# Patient Record
Sex: MALE | Race: WHITE | NOT HISPANIC OR LATINO | Employment: OTHER | URBAN - METROPOLITAN AREA
[De-identification: names, ages, dates, MRNs, and addresses within clinical notes are randomized per-mention and may not be internally consistent; named-entity substitution may affect disease eponyms.]

---

## 2017-08-21 ENCOUNTER — OFFICE VISIT (OUTPATIENT)
Dept: SURGERY | Facility: CLINIC | Age: 32
End: 2017-08-21

## 2017-08-21 VITALS
OXYGEN SATURATION: 97 % | DIASTOLIC BLOOD PRESSURE: 78 MMHG | HEIGHT: 71 IN | BODY MASS INDEX: 30.38 KG/M2 | TEMPERATURE: 98.4 F | SYSTOLIC BLOOD PRESSURE: 128 MMHG | HEART RATE: 63 BPM | RESPIRATION RATE: 12 BRPM | WEIGHT: 217 LBS

## 2017-08-21 DIAGNOSIS — R10.32 GROIN PAIN, LEFT: ICD-10-CM

## 2017-08-21 DIAGNOSIS — R10.32 LEFT LOWER QUADRANT PAIN: Primary | ICD-10-CM

## 2017-08-21 PROCEDURE — 99203 OFFICE O/P NEW LOW 30 MIN: CPT | Performed by: SURGERY

## 2017-08-21 NOTE — PROGRESS NOTES
"    PATIENT INFORMATION  Garry Alcantara   - 1985    CHIEF COMPLAINT  Chief Complaint   Patient presents with   • Hernia   Possible LIH x 1 month,   Abdominal pain-left lower quadrant ×1 month  No imaging    HISTORY OF PRESENT ILLNESS  HPI  Patient is a 32-year-old  male referred to general surgery for possible left inguinal hernia.  He reports that about a month ago he was pushing 150 pounds of hay when he felt pain in the left lower quadrant and left groin and possibly felt \"a pop\"as he was reaching around .  He denies feeling a bulge.  He denies being able to reduce anything.  He reports that now he feels a burning sensation in the left lower quadrant denies feeling a bulge.  Denies nausea, vomiting, change in bowel movements, melena, hematochezia.  He was seen at the urgent care on 17 with this complaint -- I have reviewed their office records.  He was referred to general surgery for further evaluation and treatment.      Per review of records patient had a CT scan done.  Urgent care in 2016 for kidney stones there is a mention in the report of fat in the bilateral inguinal canals.  I do not have the images.    REVIEW OF SYSTEMS  Review of Systems   Constitutional: Positive for activity change. Negative for appetite change, fatigue, fever and unexpected weight change.   Respiratory: Negative.    Cardiovascular: Negative.    Gastrointestinal: Positive for abdominal pain.   Genitourinary: Negative.    Musculoskeletal: Negative.    Neurological: Negative.    Hematological: Negative.          ACTIVE PROBLEMS  Patient Active Problem List    Diagnosis   • Left lower quadrant pain [R10.32]   • Left inguinal pain [R10.30]         PAST MEDICAL HISTORY  History reviewed. No pertinent past medical history.      SURGICAL HISTORY  Past Surgical History:   Procedure Laterality Date   • WISDOM TOOTH EXTRACTION           FAMILY HISTORY  Family History   Problem Relation Age of Onset   • " "Hypertension Father    • Colon cancer Paternal Grandfather          SOCIAL HISTORY  Social History     Occupational History   • Not on file.     Social History Main Topics   • Smoking status: Never Smoker   • Smokeless tobacco: Not on file   • Alcohol use Yes      Comment: socially   • Drug use: Not on file   • Sexual activity: Not on file         CURRENT MEDICATIONS  No current outpatient prescriptions on file.    ALLERGIES  Review of patient's allergies indicates no known allergies.    VITALS  Vitals:    08/21/17 1000   BP: 128/78   Pulse: 63   Resp: 12   Temp: 98.4 °F (36.9 °C)   SpO2: 97%   Weight: 217 lb (98.4 kg)   Height: 71\" (180.3 cm)       LAST RESULTS   No results found for any previous visit.  No results found.    PHYSICAL EXAM  Physical Exam   Constitutional: He is oriented to person, place, and time. He appears well-developed and well-nourished.   HENT:   Head: Normocephalic and atraumatic.   Eyes: EOM are normal. Pupils are equal, round, and reactive to light. No scleral icterus.   Neck: Normal range of motion. Neck supple.   Pulmonary/Chest: Breath sounds normal.   Abdominal:   Soft, nondistended, nontender, positive bowel sounds in all 4 quadrants.  Groin exam- positive bilateral impulse with Valsalva    (Bilateral groin exam done in presence of chaperone)   Musculoskeletal: He exhibits no edema.   Neurological: He is alert and oriented to person, place, and time.   Skin: Skin is warm and dry.   Nursing note and vitals reviewed.      ASSESSMENT    Abdominal pain and left lower quadrant  Left groin pain versus groin strain    Discussed with patient and his wife will check a CT abdomen and pelvis with oral and IV contrast, BMP prior to that - 2 to rule out other etiologies of his abdominal pain such as diverticulitis, groin strain, groin hematoma and possible occult hernia.    I have also asked the patient to get me the CD of the CAT scan.        Diagnoses and all orders for this visit:    Left lower " quadrant pain  -     CT Abdomen Pelvis With Contrast  -     Basic Metabolic Panel; Future    Groin pain, left  -     CT Abdomen Pelvis With Contrast  -     Basic Metabolic Panel; Future          PLAN  Follow-up after testing is completed.  Worsening signs and symptoms discussed with the patient in detail and he was advised to call the office sooner should he have worsening symptoms or go to the nearest emergency room.

## 2017-08-28 ENCOUNTER — HOSPITAL ENCOUNTER (OUTPATIENT)
Dept: CT IMAGING | Facility: HOSPITAL | Age: 32
Discharge: HOME | End: 2017-08-28
Attending: SURGERY | Admitting: SURGERY

## 2017-08-28 ENCOUNTER — LAB (OUTPATIENT)
Dept: LAB | Facility: HOSPITAL | Age: 32
End: 2017-08-28
Attending: SURGERY

## 2017-08-28 DIAGNOSIS — R10.32 LEFT LOWER QUADRANT PAIN: ICD-10-CM

## 2017-08-28 DIAGNOSIS — R10.32 GROIN PAIN, LEFT: ICD-10-CM

## 2017-08-28 LAB
ANION GAP SERPL CALCULATED.3IONS-SCNC: 11 MMOL/L
BUN BLD-MCNC: 14 MG/DL (ref 6–20)
BUN/CREAT SERPL: 12.5 (ref 7–25)
CALCIUM SPEC-SCNC: 8.6 MG/DL (ref 8.6–10.5)
CHLORIDE SERPL-SCNC: 104 MMOL/L (ref 98–107)
CO2 SERPL-SCNC: 25 MMOL/L (ref 22–29)
CREAT BLD-MCNC: 1.12 MG/DL (ref 0.76–1.27)
GFR SERPL CREATININE-BSD FRML MDRD: 76 ML/MIN/1.73
GLUCOSE BLD-MCNC: 97 MG/DL (ref 65–99)
POTASSIUM BLD-SCNC: 4.3 MMOL/L (ref 3.5–5.2)
SODIUM BLD-SCNC: 140 MMOL/L (ref 136–145)

## 2017-08-28 PROCEDURE — 74177 CT ABD & PELVIS W/CONTRAST: CPT

## 2017-08-28 PROCEDURE — 0 IOPAMIDOL PER 1 ML: Performed by: SURGERY

## 2017-08-28 PROCEDURE — 80048 BASIC METABOLIC PNL TOTAL CA: CPT

## 2017-08-28 PROCEDURE — 36415 COLL VENOUS BLD VENIPUNCTURE: CPT

## 2017-08-28 RX ADMIN — IOPAMIDOL 100 ML: 755 INJECTION, SOLUTION INTRAVENOUS at 10:15

## 2017-09-21 ENCOUNTER — OFFICE VISIT (OUTPATIENT)
Dept: GASTROENTEROLOGY | Facility: CLINIC | Age: 32
End: 2017-09-21

## 2017-09-21 VITALS
HEIGHT: 71 IN | SYSTOLIC BLOOD PRESSURE: 130 MMHG | BODY MASS INDEX: 30.38 KG/M2 | WEIGHT: 217 LBS | DIASTOLIC BLOOD PRESSURE: 78 MMHG

## 2017-09-21 DIAGNOSIS — K76.0 HEPATIC STEATOSIS: Primary | ICD-10-CM

## 2017-09-21 PROCEDURE — 99203 OFFICE O/P NEW LOW 30 MIN: CPT | Performed by: INTERNAL MEDICINE

## 2017-09-21 NOTE — PROGRESS NOTES
PATIENT INFORMATION  Garry Alcantara       - 1985    CHIEF COMPLAINT  Chief Complaint   Patient presents with   • Abdominal Pain       HISTORY OF PRESENT ILLNESS  Abdominal Pain   Associated symptoms include headaches.     31 yo sent to see me because of hepatic steatosis noted on CT. He saw Dr. Mackey for LLQ pain and possible hernia. CT was negative for hernia. He was subsequently sent to me. He drinks a beer every 3 month. Weight has been stable. Labs which are available to me from 2016 showed normal liver enzymes. No family history of liver disease.   No changes in bowel habits or blood in the stool. No ivda, blood transfusions, tattoos. His current occupation is farming. No episodes of jaundice or ascites.      REVIEW OF SYSTEMS  Review of Systems   Gastrointestinal: Positive for abdominal pain.   Genitourinary: Positive for genital sores.   Musculoskeletal: Positive for back pain.   Neurological: Positive for headaches.   All other systems reviewed and are negative.        ACTIVE PROBLEMS  Patient Active Problem List    Diagnosis   • Left lower quadrant pain [R10.32]   • Left inguinal pain [R10.30]         PAST MEDICAL HISTORY  No past medical history on file.      SURGICAL HISTORY  Past Surgical History:   Procedure Laterality Date   • WISDOM TOOTH EXTRACTION           FAMILY HISTORY  Family History   Problem Relation Age of Onset   • Hypertension Father    • Colon cancer Paternal Grandfather    • Cirrhosis Neg Hx          SOCIAL HISTORY  Social History     Occupational History   • Not on file.     Social History Main Topics   • Smoking status: Never Smoker   • Smokeless tobacco: Not on file   • Alcohol use Yes      Comment: socially   • Drug use: Not on file   • Sexual activity: Not on file         CURRENT MEDICATIONS  No current outpatient prescriptions on file.    ALLERGIES  Review of patient's allergies indicates no known allergies.    VITALS  Vitals:    17 1038   BP:  "130/78   Weight: 217 lb (98.4 kg)   Height: 71\" (180.3 cm)       LAST RESULTS   Lab on 08/28/2017   Component Date Value Ref Range Status   • Glucose 08/28/2017 97  65 - 99 mg/dL Final   • BUN 08/28/2017 14  6 - 20 mg/dL Final   • Creatinine 08/28/2017 1.12  0.76 - 1.27 mg/dL Final   • Sodium 08/28/2017 140  136 - 145 mmol/L Final   • Potassium 08/28/2017 4.3  3.5 - 5.2 mmol/L Final   • Chloride 08/28/2017 104  98 - 107 mmol/L Final   • CO2 08/28/2017 25.0  22.0 - 29.0 mmol/L Final   • Calcium 08/28/2017 8.6  8.6 - 10.5 mg/dL Final   • eGFR Non African Amer 08/28/2017 76  >60 mL/min/1.73 Final   • BUN/Creatinine Ratio 08/28/2017 12.5  7.0 - 25.0 Final   • Anion Gap 08/28/2017 11.0  mmol/L Final     Ct Abdomen Pelvis With Contrast    Result Date: 8/28/2017  Narrative: EXAM: CT ABDOMEN PELVIS WITH CONTRAST.  DATE: 08/28/2017  HISTORY: BILATERAL INGUINAL PAIN INTERMITTENTLY FOR ONE MONTH. EVALUATE FOR HERNIA.  COMPARISON: NONE   TECHNIQUE: 5 mm axial images from lung bases to the lesser trochanters after intravenous and enteric contrast administration. Radiation dose reduction techniques were utilized, including automated exposure control and exposure modulation based on body size.  ABDOMEN FINDINGS: There is mild geographic steatosis within the liver. The gallbladder, spleen, pancreas, adrenals and kidneys are within normal limits. Imaged lung bases are free of consolidation. No free air or free fluid is seen. The appendix is normal. Bowel appears nonthickened and noninflamed.  No ventral abdominal wall hernia is seen.  Pelvis findings: No inguinal hernia is identified. Shotty bilateral inguinal lymph nodes are present and are not thought to be pathologically enlarged by CT criteria. The urinary bladder, prostate and rectum are normal. No pelvic free fluid is identified.  No acute osseous abnormalities are identified.      Impression: 1. No inguinal hernia. No acute findings in the abdomen or pelvis. 2. Geographic " hepatic steatosis.  This report was finalized on 8/28/2017 12:55 PM by Dr. Joslyn Porter MD.        PHYSICAL EXAM  Physical Exam   Constitutional: He is oriented to person, place, and time. He appears well-developed and well-nourished. No distress.   HENT:   Head: Normocephalic and atraumatic.   Eyes: EOM are normal. Pupils are equal, round, and reactive to light.   Neck: Neck supple. No tracheal deviation present.   Cardiovascular: Normal rate, regular rhythm, normal heart sounds and intact distal pulses.  Exam reveals no gallop and no friction rub.    No murmur heard.  Pulmonary/Chest: Effort normal and breath sounds normal. No respiratory distress. He has no wheezes. He has no rales. He exhibits no tenderness.   Abdominal: Soft. Bowel sounds are normal. He exhibits no distension. There is no tenderness. There is no rebound and no guarding.   Musculoskeletal: He exhibits no edema.   Lymphadenopathy:     He has no cervical adenopathy.   Neurological: He is alert and oriented to person, place, and time.   Skin: Skin is warm. He is not diaphoretic. No erythema.   Psychiatric: He has a normal mood and affect. His behavior is normal. Judgment and thought content normal.   Nursing note and vitals reviewed.      ASSESSMENT  Diagnoses and all orders for this visit:    Hepatic steatosis  -     Lipid Panel  -     Hepatic Function Panel          PLAN  No Follow-up on file.    NAFLD discussed  Weight loss  Low carb diet.  Will check above labs.

## 2018-04-20 ENCOUNTER — OFFICE VISIT (OUTPATIENT)
Dept: SURGERY | Facility: CLINIC | Age: 33
End: 2018-04-20

## 2018-04-20 VITALS
SYSTOLIC BLOOD PRESSURE: 126 MMHG | BODY MASS INDEX: 30.24 KG/M2 | DIASTOLIC BLOOD PRESSURE: 72 MMHG | WEIGHT: 216 LBS | HEIGHT: 71 IN

## 2018-04-20 DIAGNOSIS — R10.30 LOWER ABDOMINAL PAIN: Primary | ICD-10-CM

## 2018-04-20 PROCEDURE — 99213 OFFICE O/P EST LOW 20 MIN: CPT | Performed by: SURGERY

## 2018-04-20 NOTE — PROGRESS NOTES
PATIENT INFORMATION  aGrry Alcantara   - 1985    CHIEF COMPLAINT  Chief Complaint   Patient presents with   • Follow-up   F/U GROIN PAIN, PT STATES PAIN IS BELOW THE UMBLICAL AND SUPRAPUBIC REGION    HISTORY OF PRESENT ILLNESS  HPI  Patient known to me from previous office visit in 2017.  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  He reports his father was with him who palpated it and felt that he had a hernia.  He had significant pain associated with this.  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  He went to [facility] emergency room but left without seeing any physician.  He reports since then he has not noticed a bulge but he has felt pain in the suprapubic and umbilical/infraumbilical region.  Describes pain as being a constant dull ache with vaccine intervening sharp stabbing-like in nature.  No particular aggravating or relieving factor other than the fact that at the end of the day and multiple hours of being involved in physical labor he notices the pain is worse.  Denies any nausea, vomiting, alternating bowel movements I.e ; diarrhea, constipation, melena, hematochezia.      REVIEW OF SYSTEMS  Review of Systems   Constitutional: Negative.    Respiratory: Negative.    Cardiovascular: Negative.    Gastrointestinal: Positive for abdominal pain.   Genitourinary: Negative.    Musculoskeletal: Negative.          ACTIVE PROBLEMS  Patient Active Problem List    Diagnosis   • Left lower quadrant pain [R10.32]   • Left inguinal pain [R10.32]         PAST MEDICAL HISTORY  History reviewed. No pertinent past medical history.      SURGICAL HISTORY  Past Surgical History:   Procedure Laterality Date   • WISDOM TOOTH EXTRACTION    "        FAMILY HISTORY  Family History   Problem Relation Age of Onset   • Hypertension Father    • Colon cancer Paternal Grandfather    • Cirrhosis Neg Hx          SOCIAL HISTORY  Social History     Occupational History   • Not on file.     Social History Main Topics   • Smoking status: Never Smoker   • Smokeless tobacco: Not on file   • Alcohol use Yes      Comment: socially   • Drug use: Unknown   • Sexual activity: Not on file         CURRENT MEDICATIONS  No current outpatient prescriptions on file.    ALLERGIES  Review of patient's allergies indicates no known allergies.    VITALS  Vitals:    04/20/18 1437   BP: 126/72   Weight: 98 kg (216 lb)   Height: 180.3 cm (71\")       LAST RESULTS   Lab on 08/28/2017   Component Date Value Ref Range Status   • Glucose 08/28/2017 97  65 - 99 mg/dL Final   • BUN 08/28/2017 14  6 - 20 mg/dL Final   • Creatinine 08/28/2017 1.12  0.76 - 1.27 mg/dL Final   • Sodium 08/28/2017 140  136 - 145 mmol/L Final   • Potassium 08/28/2017 4.3  3.5 - 5.2 mmol/L Final   • Chloride 08/28/2017 104  98 - 107 mmol/L Final   • CO2 08/28/2017 25.0  22.0 - 29.0 mmol/L Final   • Calcium 08/28/2017 8.6  8.6 - 10.5 mg/dL Final   • eGFR Non African Amer 08/28/2017 76  >60 mL/min/1.73 Final   • BUN/Creatinine Ratio 08/28/2017 12.5  7.0 - 25.0 Final   • Anion Gap 08/28/2017 11.0  mmol/L Final     No results found.    PHYSICAL EXAM  Physical Exam   Constitutional: He is oriented to person, place, and time. He appears well-developed and well-nourished.   Eyes: Scleral icterus is present.   Neck: Neck supple.   Cardiovascular: Normal rate, regular rhythm and normal heart sounds.    Pulmonary/Chest: Breath sounds normal.   Abdominal:   Soft, nondistended, mild periumbilical and suprapubic tenderness.  No rebound, no guarding no rigidity.  No ventral/inguinal hernias palpable.  (Patient examined in presence of chaperone)   Musculoskeletal: He exhibits no edema.   Neurological: He is alert and oriented to " person, place, and time.   Nursing note and vitals reviewed.      ASSESSMENT  Abdominal pain-likely muscular strain  Groin strain    Pros and cons risks and benefits discussed with patient and his wife in detail.  Trial of anti-inflammatory medication and ice/heat therapy discussed.  Weightlifting restrictions discussed.  Patient verbalized understanding.     They are extremely concerned.  Will obtain CT scan abdomen and pelvis to rule out any other etiology- including occult hernia     PLAN  Follow-up after testing.  Patient was advised to call the office sooner should he have worsening symptoms or go to the nearest emergency room.

## 2018-04-26 ENCOUNTER — HOSPITAL ENCOUNTER (OUTPATIENT)
Dept: CT IMAGING | Facility: HOSPITAL | Age: 33
Discharge: HOME | End: 2018-04-26
Attending: SURGERY | Admitting: SURGERY

## 2018-04-26 DIAGNOSIS — R10.30 LOWER ABDOMINAL PAIN: ICD-10-CM

## 2018-04-26 PROCEDURE — 74177 CT ABD & PELVIS W/CONTRAST: CPT

## 2018-04-26 PROCEDURE — 0 DIATRIZOATE MEGLUMINE & SODIUM PER 1 ML: Performed by: SURGERY

## 2018-04-26 PROCEDURE — 0 IOPAMIDOL PER 1 ML: Performed by: SURGERY

## 2018-04-26 RX ADMIN — IOPAMIDOL 100 ML: 755 INJECTION, SOLUTION INTRAVENOUS at 10:00

## 2018-04-26 RX ADMIN — DIATRIZOATE MEGLUMINE AND DIATRIZOATE SODIUM 30 ML: 600; 100 SOLUTION ORAL; RECTAL at 08:45

## 2018-05-21 ENCOUNTER — OFFICE VISIT (OUTPATIENT)
Dept: SURGERY | Facility: CLINIC | Age: 33
End: 2018-05-21

## 2018-05-21 VITALS
HEIGHT: 71 IN | DIASTOLIC BLOOD PRESSURE: 70 MMHG | WEIGHT: 212 LBS | SYSTOLIC BLOOD PRESSURE: 124 MMHG | BODY MASS INDEX: 29.68 KG/M2

## 2018-05-21 DIAGNOSIS — Z09 FOLLOW UP: Primary | ICD-10-CM

## 2018-05-21 DIAGNOSIS — K40.90 NON-RECURRENT UNILATERAL INGUINAL HERNIA WITHOUT OBSTRUCTION OR GANGRENE: ICD-10-CM

## 2018-05-21 PROCEDURE — 99213 OFFICE O/P EST LOW 20 MIN: CPT | Performed by: SURGERY

## 2018-05-21 NOTE — PROGRESS NOTES
PATIENT INFORMATION  Garry Alcantara   - 1985    CHIEF COMPLAINT  Chief Complaint   Patient presents with   • Follow-up   F/U CT SCAN, Left Groin pain resolved     HISTORY OF PRESENT ILLNESS  HPI  Patient was as the office today for follow-up and to discuss CT scan results.  He reports he still experiences mild left groin pain which will usually resolve after rest and taking OTC anti-inflammatory medications..  He denies feeling a bulge in this area.  Reports the pain is usually at the end of the day.  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 CT scan abdomen and pelvis were done at [facility].  I have reviewed the images personally and with the patient and his wife in detail a tiny fat-containing left inguinal hernia noted on CT scan.  No other hernias or other intra-abdominal abnormalities.  He denies any nausea, vomiting, alternating bowel movements, constipation, obstipation or any other symptoms referral to bowel obstruction.    REVIEW OF SYSTEMS  Review of Systems   Constitutional: Negative.    Respiratory: Negative.    Cardiovascular: Negative.    Genitourinary: Negative.    Musculoskeletal: Negative.          ACTIVE PROBLEMS  Patient Active Problem List    Diagnosis   • Left lower quadrant pain [R10.32]   • Left inguinal pain [R10.32]         PAST MEDICAL HISTORY  History reviewed. No pertinent past medical history.      SURGICAL HISTORY  Past Surgical History:   Procedure Laterality Date   • WISDOM TOOTH EXTRACTION           FAMILY HISTORY  Family History   Problem Relation Age of Onset   • Hypertension Father    • Colon cancer Paternal Grandfather    • Cirrhosis Neg Hx          SOCIAL HISTORY  Social History     Occupational History   • Not on file.     Social History Main Topics   • Smoking status: Never Smoker   • Smokeless tobacco: Not on file   • Alcohol use Yes      Comment: socially   • Drug use: Unknown   • Sexual activity:  "Not on file         CURRENT MEDICATIONS  No current outpatient prescriptions on file.    ALLERGIES  Patient has no known allergies.    VITALS  Vitals:    05/21/18 0944   BP: 124/70   Weight: 96.2 kg (212 lb)   Height: 180.3 cm (71\")       LAST RESULTS   Lab on 08/28/2017   Component Date Value Ref Range Status   • Glucose 08/28/2017 97  65 - 99 mg/dL Final   • BUN 08/28/2017 14  6 - 20 mg/dL Final   • Creatinine 08/28/2017 1.12  0.76 - 1.27 mg/dL Final   • Sodium 08/28/2017 140  136 - 145 mmol/L Final   • Potassium 08/28/2017 4.3  3.5 - 5.2 mmol/L Final   • Chloride 08/28/2017 104  98 - 107 mmol/L Final   • CO2 08/28/2017 25.0  22.0 - 29.0 mmol/L Final   • Calcium 08/28/2017 8.6  8.6 - 10.5 mg/dL Final   • eGFR Non African Amer 08/28/2017 76  >60 mL/min/1.73 Final   • BUN/Creatinine Ratio 08/28/2017 12.5  7.0 - 25.0 Final   • Anion Gap 08/28/2017 11.0  mmol/L Final     Ct Abdomen Pelvis With Contrast    Result Date: 4/26/2018  Narrative: CT ABDOMEN AND PELVIS WITH CONTRAST, 4/26/2018:  HISTORY: 33-year-old male noting recent acute anterior abdominal wall pain and bulge just below the umbilicus and suprapubic region while doing far more. He no longer fills of bulge but does note some suprapubic and infra umbilical region tenderness. CT examination for similar symptoms here last year.  TECHNIQUE: CT examination of the abdomen and pelvis with oral and IV contrast. Radiation dose reduction techniques included automated exposure control or exposure modulation based on body size. Radiation audit for CT and nuclear cardiology exams in the last 12 months: 1.  COMPARISON: *  CT abdomen/pelvis, 8/28/2017.  ABDOMEN FINDINGS: No anterior abdominal wall hernia or significant umbilical hernia is demonstrated. No mass or fluid collection within the anterior abdominal wall.  Liver, pancreas and spleen are normal in size and appearance. No gallbladder distention or bile duct dilatation.  Both kidneys are negative with no evidence of " urinary obstruction. Normal adrenal glands. Normal caliber abdominal aorta.  Small bowel and colon are normal in caliber and appearance. Normal appendix.  PELVIS FINDINGS: Tiny fat-containing indirect left inguinal hernia is new or slightly larger today than on the prior exam. No significant right inguinal hernia. Urinary bladder, prostate and rectum are within normal limits.      Impression: 1. No significant umbilical hernia or other anterior abdominal wall hernia is demonstrated. 2. Tiny fat-containing left inguinal hernia. 3. The remainder of the examination is negative.  This report was finalized on 4/26/2018 12:33 PM by Dr. Jose Meneses MD.        PHYSICAL EXAM  Physical Exam   Constitutional: He is oriented to person, place, and time. He appears well-developed and well-nourished.   Cardiovascular: Normal rate, regular rhythm and normal heart sounds.    Pulmonary/Chest: Breath sounds normal.   Abdominal:   Soft, nondistended, nontender positive bowel sounds in all 4 quadrants    No obvious hernias.    Bilateral groin exam-no inguinal hernia palpable on either side but there is impulse with Valsalva on the left.    (Patient examined in presence of chaperone)   Musculoskeletal: He exhibits no edema.   Neurological: He is alert and oriented to person, place, and time.   Nursing note and vitals reviewed.      ASSESSMENT  Left groin strain  Tiny left reducible inguinal hernia    Surgical versus nonsurgical options-pros and cons/ risks and benefits discussed with him and his wife in detail.  I discussed with them the different types of surgical repairs including laparoscopic versus open with mesh insertion, I discussed in detail the procedures, risks and benefits associated with each type of repair.  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  He would like to postpone surgery until the fall.  I think it is reasonable considering that this is an  extremely tiny defect.  His wife is an RN and I have discussed worsening signs and symptoms with her in detail.      PLAN  I will see him back in 2 months  We did discuss weight lifting restrictions.    Worsening signs and symptoms including signs and symptoms of incarceration and strangulation were discussed with them in detail.  He is to call my office or go to the nearest emergency room should he experience those.

## 2018-11-07 ENCOUNTER — OFFICE VISIT (OUTPATIENT)
Dept: SURGERY | Facility: CLINIC | Age: 33
End: 2018-11-07

## 2018-11-07 VITALS
WEIGHT: 216 LBS | SYSTOLIC BLOOD PRESSURE: 128 MMHG | HEIGHT: 71 IN | DIASTOLIC BLOOD PRESSURE: 78 MMHG | BODY MASS INDEX: 30.24 KG/M2

## 2018-11-07 DIAGNOSIS — K40.90 NON-RECURRENT UNILATERAL INGUINAL HERNIA WITHOUT OBSTRUCTION OR GANGRENE: Primary | ICD-10-CM

## 2018-11-07 PROCEDURE — 99212 OFFICE O/P EST SF 10 MIN: CPT | Performed by: SURGERY

## 2018-11-07 NOTE — PROGRESS NOTES
PATIENT INFORMATION  Garry MEJIA, HAS SEEN DR. MAGALLON IN THE PAST, CT DONE 18, HAS DISCOMFORT ASSOCIATED WITH HERNIA     - 1985    CHIEF COMPLAINT  Chief Complaint   Patient presents with   • Hernia       HISTORY OF PRESENT ILLNESS  HPI he complains of left groin pain that has been increasing in frequency.  He saw Dr. Magallon for this who ordered a CT scan.  He deferred on treatment at that time.  He denies any GI complaints he denies any swelling in his groin.  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  He says it is a busy time for him at work.        REVIEW OF SYSTEMS  Review of Systems otherwise negative      ACTIVE PROBLEMS  Patient Active Problem List    Diagnosis   • Left lower quadrant pain [R10.32]   • Left inguinal pain [R10.32]         PAST MEDICAL HISTORY  No past medical history on file.      SURGICAL HISTORY  Past Surgical History:   Procedure Laterality Date   • WISDOM TOOTH EXTRACTION           FAMILY HISTORY  Family History   Problem Relation Age of Onset   • Hypertension Father    • Colon cancer Paternal Grandfather    • Cirrhosis Neg Hx          SOCIAL HISTORY  Social History     Occupational History   • Not on file.     Social History Main Topics   • Smoking status: Never Smoker   • Smokeless tobacco: Not on file   • Alcohol use Yes      Comment: socially   • Drug use: Unknown   • Sexual activity: Not on file       Debilities/Disabilities Identified: None    Emotional Behavior: Appropriate    CURRENT MEDICATIONS  No current outpatient prescriptions on file.    ALLERGIES  Patient has no known allergies.    VITALS  There were no vitals filed for this visit.    LAST RESULTS   Lab on 2017   Component Date Value Ref Range Status   • Glucose 2017 97  65 - 99 mg/dL Final   • BUN 2017 14  6 - 20 mg/dL Final   • Creatinine 2017 1.12  0.76 - 1.27 mg/dL Final   • Sodium 2017 140  136 - 145 mmol/L Final   • Potassium 2017 4.3  3.5 - 5.2  mmol/L Final   • Chloride 08/28/2017 104  98 - 107 mmol/L Final   • CO2 08/28/2017 25.0  22.0 - 29.0 mmol/L Final   • Calcium 08/28/2017 8.6  8.6 - 10.5 mg/dL Final   • eGFR Non African Amer 08/28/2017 76  >60 mL/min/1.73 Final   • BUN/Creatinine Ratio 08/28/2017 12.5  7.0 - 25.0 Final   • Anion Gap 08/28/2017 11.0  mmol/L Final     No results found.    PHYSICAL EXAM  Physical Exam the cell alert white male in no active distress.  He is oriented ×3.  His gait is normal.  His trachea is in the midline.  His heart shows a regular rate and rhythm.  His lungs are clear and equal.  His testes descended bilaterally without mass.  He has no evidence of a right inguinal hernia.  He has this reducible small left inguinal impulse.  He has no umbilical impulse.  I reviewed his prior CT scan myself and shows a small left inguinal hernia.  I reviewed the prior notes from Dr. Mackey    ASSESSMENT  Left inguinal hernia      PLAN  The risks benefits and options were discussed with the patient in detail.  Symptoms to look for for incarceration and strangulation as well.  He defers on surgery at this point because it is a busy work time for him but will call probably in December or January to schedule surgery.

## 2019-08-26 ENCOUNTER — APPOINTMENT (OUTPATIENT)
Dept: GENERAL RADIOLOGY | Facility: HOSPITAL | Age: 34
End: 2019-08-26

## 2019-08-26 ENCOUNTER — HOSPITAL ENCOUNTER (EMERGENCY)
Facility: HOSPITAL | Age: 34
Discharge: HOME | End: 2019-08-27
Attending: EMERGENCY MEDICINE | Admitting: EMERGENCY MEDICINE

## 2019-08-26 DIAGNOSIS — J45.40 MODERATE PERSISTENT REACTIVE AIRWAY DISEASE WITHOUT COMPLICATION: ICD-10-CM

## 2019-08-26 DIAGNOSIS — J98.01 ACUTE BRONCHOSPASM: Primary | ICD-10-CM

## 2019-08-26 LAB
ALBUMIN SERPL-MCNC: 4.3 G/DL (ref 3.5–5.2)
ALBUMIN/GLOB SERPL: 1.7 G/DL
ALP SERPL-CCNC: 49 U/L (ref 39–117)
ALT SERPL W P-5'-P-CCNC: 28 U/L (ref 1–41)
ANION GAP SERPL CALCULATED.3IONS-SCNC: 10.9 MMOL/L (ref 5–15)
AST SERPL-CCNC: 23 U/L (ref 1–40)
BASOPHILS # BLD AUTO: 0.05 10*3/MM3 (ref 0–0.2)
BASOPHILS NFR BLD AUTO: 0.8 % (ref 0–1.5)
BILIRUB SERPL-MCNC: 0.2 MG/DL (ref 0.2–1.2)
BUN BLD-MCNC: 11 MG/DL (ref 6–20)
BUN/CREAT SERPL: 10.7 (ref 7–25)
CALCIUM SPEC-SCNC: 9.6 MG/DL (ref 8.6–10.5)
CHLORIDE SERPL-SCNC: 104 MMOL/L (ref 98–107)
CO2 SERPL-SCNC: 26.1 MMOL/L (ref 22–29)
CREAT BLD-MCNC: 1.03 MG/DL (ref 0.76–1.27)
D DIMER PPP FEU-MCNC: 0.48 MCGFEU/ML (ref 0–0.46)
DEPRECATED RDW RBC AUTO: 40.7 FL (ref 37–54)
EOSINOPHIL # BLD AUTO: 0.21 10*3/MM3 (ref 0–0.4)
EOSINOPHIL NFR BLD AUTO: 3.6 % (ref 0.3–6.2)
ERYTHROCYTE [DISTWIDTH] IN BLOOD BY AUTOMATED COUNT: 12.6 % (ref 12.3–15.4)
GFR SERPL CREATININE-BSD FRML MDRD: 83 ML/MIN/1.73
GLOBULIN UR ELPH-MCNC: 2.5 GM/DL
GLUCOSE BLD-MCNC: 97 MG/DL (ref 65–99)
HCT VFR BLD AUTO: 45.3 % (ref 37.5–51)
HGB BLD-MCNC: 15.5 G/DL (ref 13–17.7)
IMM GRANULOCYTES # BLD AUTO: 0.01 10*3/MM3 (ref 0–0.05)
IMM GRANULOCYTES NFR BLD AUTO: 0.2 % (ref 0–0.5)
LYMPHOCYTES # BLD AUTO: 2.28 10*3/MM3 (ref 0.7–3.1)
LYMPHOCYTES NFR BLD AUTO: 38.7 % (ref 19.6–45.3)
MCH RBC QN AUTO: 30 PG (ref 26.6–33)
MCHC RBC AUTO-ENTMCNC: 34.2 G/DL (ref 31.5–35.7)
MCV RBC AUTO: 87.6 FL (ref 79–97)
MONOCYTES # BLD AUTO: 0.83 10*3/MM3 (ref 0.1–0.9)
MONOCYTES NFR BLD AUTO: 14.1 % (ref 5–12)
NEUTROPHILS # BLD AUTO: 2.51 10*3/MM3 (ref 1.7–7)
NEUTROPHILS NFR BLD AUTO: 42.6 % (ref 42.7–76)
NRBC BLD AUTO-RTO: 0 /100 WBC (ref 0–0.2)
PLAT MORPH BLD: NORMAL
PLATELET # BLD AUTO: 229 10*3/MM3 (ref 140–450)
PMV BLD AUTO: 11.8 FL (ref 6–12)
POTASSIUM BLD-SCNC: 4 MMOL/L (ref 3.5–5.2)
PROT SERPL-MCNC: 6.8 G/DL (ref 6–8.5)
RBC # BLD AUTO: 5.17 10*6/MM3 (ref 4.14–5.8)
RBC MORPH BLD: NORMAL
SODIUM BLD-SCNC: 141 MMOL/L (ref 136–145)
TROPONIN T SERPL-MCNC: <0.01 NG/ML (ref 0–0.03)
WBC MORPH BLD: NORMAL
WBC NRBC COR # BLD: 5.89 10*3/MM3 (ref 3.4–10.8)

## 2019-08-26 PROCEDURE — 84484 ASSAY OF TROPONIN QUANT: CPT | Performed by: EMERGENCY MEDICINE

## 2019-08-26 PROCEDURE — 93010 ELECTROCARDIOGRAM REPORT: CPT | Performed by: INTERNAL MEDICINE

## 2019-08-26 PROCEDURE — 70360 X-RAY EXAM OF NECK: CPT

## 2019-08-26 PROCEDURE — 80053 COMPREHEN METABOLIC PANEL: CPT | Performed by: EMERGENCY MEDICINE

## 2019-08-26 PROCEDURE — 71046 X-RAY EXAM CHEST 2 VIEWS: CPT

## 2019-08-26 PROCEDURE — 93005 ELECTROCARDIOGRAM TRACING: CPT | Performed by: EMERGENCY MEDICINE

## 2019-08-26 PROCEDURE — 25010000002 METHYLPREDNISOLONE PER 125 MG: Performed by: EMERGENCY MEDICINE

## 2019-08-26 PROCEDURE — 94799 UNLISTED PULMONARY SVC/PX: CPT

## 2019-08-26 PROCEDURE — 85025 COMPLETE CBC W/AUTO DIFF WBC: CPT | Performed by: EMERGENCY MEDICINE

## 2019-08-26 PROCEDURE — 85379 FIBRIN DEGRADATION QUANT: CPT | Performed by: EMERGENCY MEDICINE

## 2019-08-26 PROCEDURE — 85007 BL SMEAR W/DIFF WBC COUNT: CPT | Performed by: EMERGENCY MEDICINE

## 2019-08-26 PROCEDURE — 99284 EMERGENCY DEPT VISIT MOD MDM: CPT | Performed by: EMERGENCY MEDICINE

## 2019-08-26 PROCEDURE — 94640 AIRWAY INHALATION TREATMENT: CPT

## 2019-08-26 PROCEDURE — 99284 EMERGENCY DEPT VISIT MOD MDM: CPT

## 2019-08-26 PROCEDURE — 96374 THER/PROPH/DIAG INJ IV PUSH: CPT

## 2019-08-26 RX ORDER — AMOXICILLIN AND CLAVULANATE POTASSIUM 875; 125 MG/1; MG/1
1 TABLET, FILM COATED ORAL ONCE
Status: DISCONTINUED | OUTPATIENT
Start: 2019-08-26 | End: 2019-08-26

## 2019-08-26 RX ORDER — IPRATROPIUM BROMIDE AND ALBUTEROL SULFATE 2.5; .5 MG/3ML; MG/3ML
3 SOLUTION RESPIRATORY (INHALATION) ONCE
Status: DISCONTINUED | OUTPATIENT
Start: 2019-08-26 | End: 2019-08-27

## 2019-08-26 RX ORDER — SODIUM CHLORIDE 0.9 % (FLUSH) 0.9 %
10 SYRINGE (ML) INJECTION AS NEEDED
Status: DISCONTINUED | OUTPATIENT
Start: 2019-08-26 | End: 2019-08-27 | Stop reason: HOSPADM

## 2019-08-26 RX ORDER — METHYLPREDNISOLONE SODIUM SUCCINATE 125 MG/2ML
125 INJECTION, POWDER, LYOPHILIZED, FOR SOLUTION INTRAMUSCULAR; INTRAVENOUS ONCE
Status: COMPLETED | OUTPATIENT
Start: 2019-08-26 | End: 2019-08-26

## 2019-08-26 RX ORDER — IPRATROPIUM BROMIDE AND ALBUTEROL SULFATE 2.5; .5 MG/3ML; MG/3ML
3 SOLUTION RESPIRATORY (INHALATION) ONCE
Status: COMPLETED | OUTPATIENT
Start: 2019-08-26 | End: 2019-08-26

## 2019-08-26 RX ORDER — LIDOCAINE HYDROCHLORIDE 40 MG/ML
4 INJECTION, SOLUTION RETROBULBAR; TOPICAL ONCE
Status: DISCONTINUED | OUTPATIENT
Start: 2019-08-26 | End: 2019-08-26

## 2019-08-26 RX ORDER — GINSENG 100 MG
CAPSULE ORAL ONCE
Status: DISCONTINUED | OUTPATIENT
Start: 2019-08-26 | End: 2019-08-26

## 2019-08-26 RX ADMIN — LIDOCAINE HYDROCHLORIDE 4 ML: 40 INJECTION, SOLUTION RETROBULBAR; TOPICAL at 23:08

## 2019-08-26 RX ADMIN — IPRATROPIUM BROMIDE AND ALBUTEROL SULFATE 3 ML: .5; 3 SOLUTION RESPIRATORY (INHALATION) at 22:52

## 2019-08-26 RX ADMIN — METHYLPREDNISOLONE SODIUM SUCCINATE 125 MG: 125 INJECTION, POWDER, FOR SOLUTION INTRAMUSCULAR; INTRAVENOUS at 22:47

## 2019-08-27 ENCOUNTER — APPOINTMENT (OUTPATIENT)
Dept: CT IMAGING | Facility: HOSPITAL | Age: 34
End: 2019-08-27

## 2019-08-27 VITALS
HEART RATE: 80 BPM | TEMPERATURE: 98.4 F | DIASTOLIC BLOOD PRESSURE: 74 MMHG | OXYGEN SATURATION: 94 % | HEIGHT: 71 IN | SYSTOLIC BLOOD PRESSURE: 126 MMHG | WEIGHT: 215 LBS | BODY MASS INDEX: 30.1 KG/M2 | RESPIRATION RATE: 18 BRPM

## 2019-08-27 PROCEDURE — 0 IOPAMIDOL PER 1 ML: Performed by: EMERGENCY MEDICINE

## 2019-08-27 PROCEDURE — 71275 CT ANGIOGRAPHY CHEST: CPT

## 2019-08-27 RX ORDER — INHALER, ASSIST DEVICES
SPACER (EA) MISCELLANEOUS
Qty: 1 EACH | Refills: 0 | OUTPATIENT
Start: 2019-08-27 | End: 2020-10-24

## 2019-08-27 RX ORDER — PREDNISONE 10 MG/1
TABLET ORAL
Qty: 21 TABLET | Refills: 0 | OUTPATIENT
Start: 2019-08-27 | End: 2019-09-03

## 2019-08-27 RX ORDER — GUAIFENESIN AND CODEINE PHOSPHATE 100; 10 MG/5ML; MG/5ML
5 SOLUTION ORAL 4 TIMES DAILY PRN
Qty: 180 ML | Refills: 0 | OUTPATIENT
Start: 2019-08-27 | End: 2020-10-24

## 2019-08-27 RX ORDER — ALBUTEROL SULFATE 90 UG/1
2 AEROSOL, METERED RESPIRATORY (INHALATION) EVERY 4 HOURS PRN
Qty: 1 INHALER | Refills: 0 | Status: SHIPPED | OUTPATIENT
Start: 2019-08-27

## 2019-08-27 RX ADMIN — IOPAMIDOL 18 ML: 755 INJECTION, SOLUTION INTRAVENOUS at 00:23

## 2019-08-27 RX ADMIN — IOPAMIDOL 100 ML: 755 INJECTION, SOLUTION INTRAVENOUS at 00:23

## 2019-09-03 ENCOUNTER — HOSPITAL ENCOUNTER (EMERGENCY)
Facility: HOSPITAL | Age: 34
Discharge: HOME | End: 2019-09-03
Attending: EMERGENCY MEDICINE | Admitting: EMERGENCY MEDICINE

## 2019-09-03 VITALS
SYSTOLIC BLOOD PRESSURE: 161 MMHG | HEART RATE: 91 BPM | OXYGEN SATURATION: 98 % | RESPIRATION RATE: 40 BRPM | BODY MASS INDEX: 28.44 KG/M2 | DIASTOLIC BLOOD PRESSURE: 76 MMHG | HEIGHT: 72 IN | WEIGHT: 210 LBS | TEMPERATURE: 98.1 F

## 2019-09-03 DIAGNOSIS — F45.8 ACUTE HYPERVENTILATION SYNDROME: Primary | ICD-10-CM

## 2019-09-03 PROCEDURE — 99284 EMERGENCY DEPT VISIT MOD MDM: CPT | Performed by: EMERGENCY MEDICINE

## 2019-09-03 PROCEDURE — 99283 EMERGENCY DEPT VISIT LOW MDM: CPT

## 2019-09-04 NOTE — DISCHARGE INSTRUCTIONS
Keep your pulmonology appointment on Monday as previously scheduled.  Avoid airway irritants as much as reasonably possible.

## 2020-10-24 ENCOUNTER — APPOINTMENT (OUTPATIENT)
Dept: CT IMAGING | Facility: HOSPITAL | Age: 35
End: 2020-10-24

## 2020-10-24 ENCOUNTER — HOSPITAL ENCOUNTER (EMERGENCY)
Facility: HOSPITAL | Age: 35
Discharge: HOME | End: 2020-10-24
Attending: EMERGENCY MEDICINE | Admitting: EMERGENCY MEDICINE

## 2020-10-24 VITALS
SYSTOLIC BLOOD PRESSURE: 150 MMHG | DIASTOLIC BLOOD PRESSURE: 93 MMHG | RESPIRATION RATE: 18 BRPM | HEART RATE: 66 BPM | TEMPERATURE: 98.1 F | WEIGHT: 190 LBS | OXYGEN SATURATION: 98 % | BODY MASS INDEX: 27.2 KG/M2 | HEIGHT: 70 IN

## 2020-10-24 DIAGNOSIS — K52.9 GASTROENTERITIS: ICD-10-CM

## 2020-10-24 DIAGNOSIS — R10.12 LEFT UPPER QUADRANT PAIN: Primary | ICD-10-CM

## 2020-10-24 LAB
ALBUMIN SERPL-MCNC: 4.4 G/DL (ref 3.5–5.2)
ALBUMIN/GLOB SERPL: 1.7 G/DL
ALP SERPL-CCNC: 42 U/L (ref 39–117)
ALT SERPL W P-5'-P-CCNC: 26 U/L (ref 1–41)
ANION GAP SERPL CALCULATED.3IONS-SCNC: 9.9 MMOL/L (ref 5–15)
APTT PPP: 26.6 SECONDS (ref 24.3–38.1)
AST SERPL-CCNC: 17 U/L (ref 1–40)
BASOPHILS # BLD AUTO: 0.06 10*3/MM3 (ref 0–0.2)
BASOPHILS NFR BLD AUTO: 0.9 % (ref 0–1.5)
BILIRUB SERPL-MCNC: 0.4 MG/DL (ref 0–1.2)
BUN SERPL-MCNC: 10 MG/DL (ref 6–20)
BUN/CREAT SERPL: 8.6 (ref 7–25)
CALCIUM SPEC-SCNC: 9 MG/DL (ref 8.6–10.5)
CHLORIDE SERPL-SCNC: 105 MMOL/L (ref 98–107)
CO2 SERPL-SCNC: 26.1 MMOL/L (ref 22–29)
CREAT SERPL-MCNC: 1.16 MG/DL (ref 0.76–1.27)
DEPRECATED RDW RBC AUTO: 41.9 FL (ref 37–54)
EOSINOPHIL # BLD AUTO: 0.08 10*3/MM3 (ref 0–0.4)
EOSINOPHIL NFR BLD AUTO: 1.3 % (ref 0.3–6.2)
ERYTHROCYTE [DISTWIDTH] IN BLOOD BY AUTOMATED COUNT: 12.8 % (ref 12.3–15.4)
GFR SERPL CREATININE-BSD FRML MDRD: 72 ML/MIN/1.73
GLOBULIN UR ELPH-MCNC: 2.6 GM/DL
GLUCOSE SERPL-MCNC: 96 MG/DL (ref 65–99)
HCT VFR BLD AUTO: 47.2 % (ref 37.5–51)
HGB BLD-MCNC: 15.4 G/DL (ref 13–17.7)
IMM GRANULOCYTES # BLD AUTO: 0.01 10*3/MM3 (ref 0–0.05)
IMM GRANULOCYTES NFR BLD AUTO: 0.2 % (ref 0–0.5)
INR PPP: 1.14 (ref 0.9–1.1)
LIPASE SERPL-CCNC: 54 U/L (ref 13–60)
LYMPHOCYTES # BLD AUTO: 2.04 10*3/MM3 (ref 0.7–3.1)
LYMPHOCYTES NFR BLD AUTO: 32.1 % (ref 19.6–45.3)
MCH RBC QN AUTO: 29.1 PG (ref 26.6–33)
MCHC RBC AUTO-ENTMCNC: 32.6 G/DL (ref 31.5–35.7)
MCV RBC AUTO: 89.1 FL (ref 79–97)
MONOCYTES # BLD AUTO: 0.66 10*3/MM3 (ref 0.1–0.9)
MONOCYTES NFR BLD AUTO: 10.4 % (ref 5–12)
NEUTROPHILS NFR BLD AUTO: 3.5 10*3/MM3 (ref 1.7–7)
NEUTROPHILS NFR BLD AUTO: 55.1 % (ref 42.7–76)
NRBC BLD AUTO-RTO: 0 /100 WBC (ref 0–0.2)
PLATELET # BLD AUTO: 237 10*3/MM3 (ref 140–450)
PMV BLD AUTO: 10.2 FL (ref 6–12)
POTASSIUM SERPL-SCNC: 3.4 MMOL/L (ref 3.5–5.2)
PROT SERPL-MCNC: 7 G/DL (ref 6–8.5)
PROTHROMBIN TIME: 14.3 SECONDS (ref 12.1–15)
RBC # BLD AUTO: 5.3 10*6/MM3 (ref 4.14–5.8)
SODIUM SERPL-SCNC: 141 MMOL/L (ref 136–145)
WBC # BLD AUTO: 6.35 10*3/MM3 (ref 3.4–10.8)

## 2020-10-24 PROCEDURE — 0 IOPAMIDOL PER 1 ML: Performed by: EMERGENCY MEDICINE

## 2020-10-24 PROCEDURE — 85610 PROTHROMBIN TIME: CPT | Performed by: EMERGENCY MEDICINE

## 2020-10-24 PROCEDURE — 85025 COMPLETE CBC W/AUTO DIFF WBC: CPT | Performed by: EMERGENCY MEDICINE

## 2020-10-24 PROCEDURE — 85730 THROMBOPLASTIN TIME PARTIAL: CPT | Performed by: EMERGENCY MEDICINE

## 2020-10-24 PROCEDURE — 74177 CT ABD & PELVIS W/CONTRAST: CPT

## 2020-10-24 PROCEDURE — 83690 ASSAY OF LIPASE: CPT | Performed by: EMERGENCY MEDICINE

## 2020-10-24 PROCEDURE — 99282 EMERGENCY DEPT VISIT SF MDM: CPT | Performed by: EMERGENCY MEDICINE

## 2020-10-24 PROCEDURE — 80053 COMPREHEN METABOLIC PANEL: CPT | Performed by: EMERGENCY MEDICINE

## 2020-10-24 PROCEDURE — 99283 EMERGENCY DEPT VISIT LOW MDM: CPT

## 2020-10-24 RX ORDER — SODIUM CHLORIDE 0.9 % (FLUSH) 0.9 %
10 SYRINGE (ML) INJECTION AS NEEDED
Status: DISCONTINUED | OUTPATIENT
Start: 2020-10-24 | End: 2020-10-25 | Stop reason: HOSPADM

## 2020-10-24 RX ORDER — FAMOTIDINE 20 MG/1
40 TABLET, FILM COATED ORAL ONCE
Status: COMPLETED | OUTPATIENT
Start: 2020-10-24 | End: 2020-10-24

## 2020-10-24 RX ORDER — BUDESONIDE AND FORMOTEROL FUMARATE DIHYDRATE 160; 4.5 UG/1; UG/1
2 AEROSOL RESPIRATORY (INHALATION)
COMMUNITY

## 2020-10-24 RX ORDER — FAMOTIDINE 40 MG/1
40 TABLET, FILM COATED ORAL DAILY
Qty: 30 TABLET | Refills: 0 | Status: SHIPPED | OUTPATIENT
Start: 2020-10-24

## 2020-10-24 RX ADMIN — IOPAMIDOL 100 ML: 755 INJECTION, SOLUTION INTRAVENOUS at 21:21

## 2020-10-24 RX ADMIN — FAMOTIDINE 40 MG: 20 TABLET, FILM COATED ORAL at 22:41

## 2020-10-25 NOTE — ED PROVIDER NOTES
" EMERGENCY DEPARTMENT ENCOUNTER      Room Number: 2/02      HPI:    Chief complaint: Abdominal pain diarrhea and possible blood in stool.    Location: Patient complains of left upper quadrant pain and lower abdominal pain    Quality/Severity: The upper abdominal pain was described as burning and the lower abdominal pain is crampy    Timing/Duration: Patient relates a 4 to 5-month history of left upper quadrant pain.  Lower abdominal pain and diarrhea over the past few days    Modifying Factors: Upper abdominal pain worsened by certain foods    Associated Symptoms: Patient has seen blood in stool at times    Narrative: Pt is a 35 y.o. male who presents complaining of abdominal pain, diarrhea and possible blood in stool as noted above.  The patient relates that for at least 4 months he has been having intermittent left upper quadrant pain.  At times the patient notices normal stool with some fresh blood noted on the outside of the stools.  Patient denies anal/rectal pain.  Several days ago the patient began to have lower abdominal pain and is now having some diarrhea.  Patient describes the diarrhea as being \"clear\".  No fever.  No family history of amatory bowel disease.  No previous abdominal surgeries.      PMD: Stacie Caldwell APRN    REVIEW OF SYSTEMS  Review of Systems   Constitutional: Positive for appetite change and unexpected weight change (25 pound weight loss). Negative for fatigue and fever.   HENT: Negative for congestion.    Respiratory: Negative for cough, shortness of breath and wheezing.    Cardiovascular: Negative for chest pain and palpitations.   Gastrointestinal: Positive for abdominal pain, blood in stool, diarrhea (3 times today) and nausea (Mild). Negative for vomiting.   Genitourinary: Negative for dysuria, flank pain, hematuria and urgency.   Musculoskeletal: Negative for back pain.   Skin: Negative for rash.   Neurological: Negative for dizziness, weakness and headaches. "   Psychiatric/Behavioral: Negative for confusion.   All other systems reviewed and are negative.      PAST MEDICAL HISTORY  Active Ambulatory Problems     Diagnosis Date Noted   • Left lower quadrant pain 08/21/2017   • Left inguinal pain 08/21/2017     Resolved Ambulatory Problems     Diagnosis Date Noted   • No Resolved Ambulatory Problems     Past Medical History:   Diagnosis Date   • Asthma        PAST SURGICAL HISTORY  Past Surgical History:   Procedure Laterality Date   • HERNIA REPAIR     • WISDOM TOOTH EXTRACTION         FAMILY HISTORY  Family History   Problem Relation Age of Onset   • Hypertension Father    • Colon cancer Paternal Grandfather    • Cirrhosis Neg Hx        SOCIAL HISTORY  Social History     Socioeconomic History   • Marital status:      Spouse name: Not on file   • Number of children: Not on file   • Years of education: Not on file   • Highest education level: Not on file   Tobacco Use   • Smoking status: Never Smoker   Substance and Sexual Activity   • Alcohol use: Yes     Comment: socially   • Drug use: No       ALLERGIES  Bromfed dm [[id number]] and Wasp venom    PHYSICAL EXAM  ED Triage Vitals [10/24/20 2005]   Temp Heart Rate Resp BP SpO2   98.1 °F (36.7 °C) 66 18 150/93 98 %      Temp src Heart Rate Source Patient Position BP Location FiO2 (%)   Oral Monitor Sitting Right arm --       Physical Exam   Constitutional: He is oriented to person, place, and time and well-developed, well-nourished, and in no distress.   HENT:   Head: Normocephalic and atraumatic.   Eyes: Conjunctivae and EOM are normal.   Neck: Normal range of motion. Neck supple.   Cardiovascular: Normal rate, regular rhythm and normal heart sounds.   No murmur heard.  Pulmonary/Chest: Effort normal and breath sounds normal. No respiratory distress.   Abdominal: Soft. Bowel sounds are normal. There is no abdominal tenderness.   Genitourinary:    Genitourinary Comments: Visualization of the anus did not  reveal any obvious hemorrhoids.  Digital rectal exam was nontender and no masses were palpated.  Stool noted to be medium brown in color and heme-negative     Musculoskeletal: Normal range of motion.         General: No edema.   Neurological: He is alert and oriented to person, place, and time.   Skin: Skin is warm and dry.   Psychiatric: Affect and judgment normal.   Nursing note and vitals reviewed.      LAB RESULTS  Results for orders placed or performed during the hospital encounter of 10/24/20   Comprehensive Metabolic Panel    Specimen: Blood   Result Value Ref Range    Glucose 96 65 - 99 mg/dL    BUN 10 6 - 20 mg/dL    Creatinine 1.16 0.76 - 1.27 mg/dL    Sodium 141 136 - 145 mmol/L    Potassium 3.4 (L) 3.5 - 5.2 mmol/L    Chloride 105 98 - 107 mmol/L    CO2 26.1 22.0 - 29.0 mmol/L    Calcium 9.0 8.6 - 10.5 mg/dL    Total Protein 7.0 6.0 - 8.5 g/dL    Albumin 4.40 3.50 - 5.20 g/dL    ALT (SGPT) 26 1 - 41 U/L    AST (SGOT) 17 1 - 40 U/L    Alkaline Phosphatase 42 39 - 117 U/L    Total Bilirubin 0.4 0.0 - 1.2 mg/dL    eGFR Non African Amer 72 >60 mL/min/1.73    Globulin 2.6 gm/dL    A/G Ratio 1.7 g/dL    BUN/Creatinine Ratio 8.6 7.0 - 25.0    Anion Gap 9.9 5.0 - 15.0 mmol/L   CBC Auto Differential    Specimen: Blood   Result Value Ref Range    WBC 6.35 3.40 - 10.80 10*3/mm3    RBC 5.30 4.14 - 5.80 10*6/mm3    Hemoglobin 15.4 13.0 - 17.7 g/dL    Hematocrit 47.2 37.5 - 51.0 %    MCV 89.1 79.0 - 97.0 fL    MCH 29.1 26.6 - 33.0 pg    MCHC 32.6 31.5 - 35.7 g/dL    RDW 12.8 12.3 - 15.4 %    RDW-SD 41.9 37.0 - 54.0 fl    MPV 10.2 6.0 - 12.0 fL    Platelets 237 140 - 450 10*3/mm3    Neutrophil % 55.1 42.7 - 76.0 %    Lymphocyte % 32.1 19.6 - 45.3 %    Monocyte % 10.4 5.0 - 12.0 %    Eosinophil % 1.3 0.3 - 6.2 %    Basophil % 0.9 0.0 - 1.5 %    Immature Grans % 0.2 0.0 - 0.5 %    Neutrophils, Absolute 3.50 1.70 - 7.00 10*3/mm3    Lymphocytes, Absolute 2.04 0.70 - 3.10 10*3/mm3    Monocytes, Absolute 0.66 0.10 - 0.90  10*3/mm3    Eosinophils, Absolute 0.08 0.00 - 0.40 10*3/mm3    Basophils, Absolute 0.06 0.00 - 0.20 10*3/mm3    Immature Grans, Absolute 0.01 0.00 - 0.05 10*3/mm3    nRBC 0.0 0.0 - 0.2 /100 WBC   Protime-INR    Specimen: Blood   Result Value Ref Range    Protime 14.3 12.1 - 15.0 Seconds    INR 1.14 (H) 0.90 - 1.10   aPTT    Specimen: Blood   Result Value Ref Range    PTT 26.6 24.3 - 38.1 seconds   Lipase    Specimen: Blood   Result Value Ref Range    Lipase 54 13 - 60 U/L         I ordered the above labs and reviewed the results    RADIOLOGY  Ct Abdomen Pelvis With Contrast    Result Date: 10/24/2020  Narrative: CT ABDOMEN AND PELVIS WITH CONTRAST, 10/24/2020 HISTORY: 35-year-old male in the ED complaining of several month history of intermittent upper abdomen pain, rectal bleeding. Normal white blood cell count. TECHNIQUE: CT imaging of the abdomen and pelvis with IV contrast. Radiation dose reduction techniques included automated exposure control. Radiation audit for CT and nuclear cardiology exams in the last 12 months: 0. COMPARISON: *  CT abdomen/pelvis, 4/26/2018. ABDOMEN FINDINGS: The gallbladder is nondistended. There is no bile duct dilatation. Liver, pancreas and spleen are normal in size and appearance. Both kidneys are normal in appearance with no evidence of urinary obstruction. Normal caliber abdominal aorta. Normal adrenal glands. Small bowel and colon are normal in caliber and appearance. Normal appendix. No gastric distention or distal esophageal dilatation. PELVIS FINDINGS: Urinary bladder, prostate and rectum appear normal. No free pelvic fluid. No inguinal hernia. Lung base images show no active disease.     Impression: Negative CT imaging of the abdomen and pelvis. Signer Name: Jose Meneses MD  Signed: 10/24/2020 9:56 PM  Workstation Name: [employer]  [facility]      I ordered the above radiologic testing and reviewed the  results    PROCEDURES  Procedures      PROGRESS AND CONSULTS  ED Course as of Oct 24 2237   Sat Oct 24, 2020   2223 Unremarkable department work-up findings discussed with patient and it was explained that there may be 2 concurrent problems.  Gastritis/GERD/PUD possibilities discussed and the patient placed on high-dose H2 blocker.  The patient's lower abdominal pain is probably secondary to a gastroenteritis.  Symptomatic treatment for this problem recommended.    [ML]      ED Course User Index  [ML] Geraldo Douglas MD           MEDICAL DECISION MAKING  Results were reviewed/discussed with the patient and they were also made aware of online access. Pt also made aware that some labs, such as cultures, will not be resulted during ER visit and follow up with PMD is necessary.     MDM       DIAGNOSIS  Final diagnoses:   Left upper quadrant pain   Gastroenteritis       Latest Documented Vital Signs:  As of 22:37 EDT  BP- 150/93 HR- 66 Temp- 98.1 °F (36.7 °C) (Oral) O2 sat- 98%    DISPOSITION  Discharged in good condition       Medication List      New Prescriptions    famotidine 40 MG tablet  Commonly known as: PEPCID  Take 1 tablet by mouth Daily.        Stop    AeroChamber MV inhaler     guaiFENesin-codeine 100-10 MG/5ML liquid  Commonly known as: GUAIFENESIN AC           Where to Get Your Medications      You can get these medications from any pharmacy    Bring a paper prescription for each of these medications  · famotidine 40 MG tablet         Follow-up Information     Schedule an appointment as soon as possible for a visit  with Stacie Caldwell APRN.    Specialty: Family Medicine  Contact information:  [address] [address]  [phone]                      Geraldo Douglas MD  10/24/20 2237

## 2020-10-26 ENCOUNTER — TELEPHONE (OUTPATIENT)
Dept: GASTROENTEROLOGY | Facility: CLINIC | Age: 35
End: 2020-10-26

## 2020-10-26 NOTE — TELEPHONE ENCOUNTER
PATIENT CALLED TO SCHEDULE HIS COLONOSCOPY .  EXPLAINED WOULD NEED TO SEND FAST TRACK PACKET.  ONCE FILL OUT AND RETURN.  WILL CALL TO SCHEDULE.  E-MAIL TO HIM [email or web address] TODAY.

## 2020-10-28 ENCOUNTER — OFFICE VISIT (OUTPATIENT)
Dept: GASTROENTEROLOGY | Facility: CLINIC | Age: 35
End: 2020-10-28

## 2020-10-28 ENCOUNTER — PATIENT MESSAGE (OUTPATIENT)
Dept: GASTROENTEROLOGY | Facility: CLINIC | Age: 35
End: 2020-10-28

## 2020-10-28 ENCOUNTER — TRANSCRIBE ORDERS (OUTPATIENT)
Dept: ADMINISTRATIVE | Facility: HOSPITAL | Age: 35
End: 2020-10-28

## 2020-10-28 VITALS — TEMPERATURE: 98.5 F | BODY MASS INDEX: 26.83 KG/M2 | HEIGHT: 70 IN | WEIGHT: 187.4 LBS

## 2020-10-28 DIAGNOSIS — R11.2 NAUSEA AND VOMITING, INTRACTABILITY OF VOMITING NOT SPECIFIED, UNSPECIFIED VOMITING TYPE: ICD-10-CM

## 2020-10-28 DIAGNOSIS — R10.9 ABDOMINAL CRAMPING: ICD-10-CM

## 2020-10-28 DIAGNOSIS — R10.13 DYSPEPSIA: Primary | ICD-10-CM

## 2020-10-28 DIAGNOSIS — K92.1 BLOOD IN STOOL: ICD-10-CM

## 2020-10-28 DIAGNOSIS — Z01.818 OTHER SPECIFIED PRE-OPERATIVE EXAMINATION: Primary | ICD-10-CM

## 2020-10-28 DIAGNOSIS — R19.7 DIARRHEA OF PRESUMED INFECTIOUS ORIGIN: ICD-10-CM

## 2020-10-28 PROCEDURE — 99214 OFFICE O/P EST MOD 30 MIN: CPT | Performed by: NURSE PRACTITIONER

## 2020-10-28 RX ORDER — DICYCLOMINE HCL 20 MG
20 TABLET ORAL EVERY 6 HOURS PRN
Qty: 120 TABLET | Refills: 2 | Status: SHIPPED | OUTPATIENT
Start: 2020-10-28 | End: 2021-01-27

## 2020-10-28 RX ORDER — OMEPRAZOLE 40 MG/1
40 CAPSULE, DELAYED RELEASE ORAL DAILY
Qty: 90 CAPSULE | Refills: 3 | Status: SHIPPED | OUTPATIENT
Start: 2020-10-28 | End: 2021-02-25

## 2020-10-28 RX ORDER — ONDANSETRON 4 MG/1
4 TABLET, ORALLY DISINTEGRATING ORAL EVERY 8 HOURS PRN
Qty: 20 TABLET | Refills: 3 | Status: SHIPPED | OUTPATIENT
Start: 2020-10-28

## 2020-10-28 NOTE — PATIENT INSTRUCTIONS
EGD and colonoscopy asap.     Omeprazole 40mg by mouth once daily, Dicyclomine (Bentyl) 20mg by mouth every 6 hours as needed for diarrhea and abdominal, zofran for nausea.  And use over the counter immodium for diarrhea as needed.   Don't eat late, don't eat fried and don't eat too much at one time. Avoid tomato based products like pizza, lasagna, spagetti.  If you want to have these take an over the counter Pepcid immediately before you eat them.  Do not eat within 3-4 hrs of bedtime. Limit caffeine and carbonated beverages, if you must have them do not have later in the day.  If reflux is severe elevate the head of the bed. You may also use TUMS, maalox, or mylanta for breakthrough heartburn.    Take a daily probiotic for your gut as well.  AVOID taking NSAIDS (like ibuprofen, Aleve, Motrin, naproxen, meloxicam, etc) as much as possible and use acetaminophen (Tylenol) instead.    Drink lots of water, eat a high fiber diet with 25-30gm a day(check the fiber content in the foods you eat.  Protein bars with 15gm of fiber in each bar are a great supplement daily), and get regular exercise.     High Fiber Food suggestions:    Raymundo Protein bars from [employer] = 15gm of fiber in each bar (also gluten free)  Quest Protein bars from grocery stores= 15gm of fiber each (also gluten free)  Fiber One bars from grocery stores = 5-6gm of fiber each  [employer] Bran Buds cereal 1/2 cup = 17gm of fiber

## 2020-10-28 NOTE — PROGRESS NOTES
PATIENT INFORMATION  Garry Alcantara       - 1985    CHIEF COMPLAINT  Chief Complaint   Patient presents with   • Rectal Bleeding   • Abdominal Pain   • Diarrhea       HISTORY OF PRESENT ILLNESS  Saw Dr. Durham in 2017:      33 yo sent to see me because of hepatic steatosis noted on CT. He saw Dr. Mackey for LLQ pain and possible hernia. CT was negative for hernia. He was subsequently sent to me. He drinks a beer every 3 month. Weight has been stable. Labs which are available to me from 2016 showed normal liver enzymes. No family history of liver disease.   No changes in bowel habits or blood in the stool. No ivda, blood transfusions, tattoos. His current occupation is farming. No episodes of jaundice or ascites.    NAFLD discussed  Weight loss  Low carb diet.  Will check above labs    2018 repeat CT no liver abnormality, L inguinal hernia saw Dr. Gonzalez for this did not f/u on having surgery.    10/24/20 NEG CT abd in Er for Pain across the upper abdomen, rectal bleed off & on x several months. Has hx of kidney stones. Sx hx: hernia repair  labs done in ER all norm x INR 1.14 and K 3.4, lipase liver and hgb 15.4 all norm    Today:  5 months ago noticed blood bright red in the toilet after BM.  Noticed more severe after corn and green beans and cut those out of his diet and improved but then a month ago came back.  Now feeling upper abd pain with diarrhea with blood, vomiting.  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 Vomiting on Sat especially bad with food then bile.  Before the blood in his stool lower abd cramping 30-2hr after eating lower abd cramping and then with chills.  Diarrhea everywhere from light brown to really dark.  No solid stool in a month and a half with 30lb wt loss.            REVIEWED PERTINENT RESULTS/ LABS  No results found for: CASEREPORT, FINALDX  Lab Results   Component Value Date    HGB 15.4 10/24/2020    MCV 89.1 10/24/2020      10/24/2020    ALT 26 10/24/2020    AST 17 10/24/2020    INR 1.14 (H) 10/24/2020      Ct Abdomen Pelvis With Contrast    Result Date: 10/24/2020  Narrative: CT ABDOMEN AND PELVIS WITH CONTRAST, 10/24/2020 HISTORY: 35-year-old male in the ED complaining of several month history of intermittent upper abdomen pain, rectal bleeding. Normal white blood cell count. TECHNIQUE: CT imaging of the abdomen and pelvis with IV contrast. Radiation dose reduction techniques included automated exposure control. Radiation audit for CT and nuclear cardiology exams in the last 12 months: 0. COMPARISON: *  CT abdomen/pelvis, 4/26/2018. ABDOMEN FINDINGS: The gallbladder is nondistended. There is no bile duct dilatation. Liver, pancreas and spleen are normal in size and appearance. Both kidneys are normal in appearance with no evidence of urinary obstruction. Normal caliber abdominal aorta. Normal adrenal glands. Small bowel and colon are normal in caliber and appearance. Normal appendix. No gastric distention or distal esophageal dilatation. PELVIS FINDINGS: Urinary bladder, prostate and rectum appear normal. No free pelvic fluid. No inguinal hernia. Lung base images show no active disease.     Impression: Negative CT imaging of the abdomen and pelvis. Signer Name: Jose eMneses MD  Signed: 10/24/2020 9:56 PM  Workstation Name: [facility]  [facility]      REVIEW OF SYSTEMS  Review of Systems   Gastrointestinal: Positive for abdominal pain, anal bleeding and diarrhea.   All other systems reviewed and are negative.        ACTIVE PROBLEMS  Patient Active Problem List    Diagnosis   • Left lower quadrant pain [R10.32]   • Left inguinal pain [R10.32]         PAST MEDICAL HISTORY  Past Medical History:   Diagnosis Date   • Asthma          SURGICAL HISTORY  Past Surgical History:   Procedure Laterality Date   • HERNIA REPAIR     • WISDOM TOOTH EXTRACTION           FAMILY HISTORY  Family History   Problem  "Relation Age of Onset   • Hypertension Father    • Colon cancer Paternal Grandfather    • Cirrhosis Neg Hx          SOCIAL HISTORY  Social History     Occupational History   • Not on file   Tobacco Use   • Smoking status: Never Smoker   • Smokeless tobacco: Never Used   Substance and Sexual Activity   • Alcohol use: Yes     Comment: socially   • Drug use: No   • Sexual activity: Not on file         CURRENT MEDICATIONS    Current Outpatient Medications:   •  albuterol sulfate  (90 Base) MCG/ACT inhaler, Inhale 2 puffs Every 4 (Four) Hours As Needed for Wheezing or Shortness of Air., Disp: 1 inhaler, Rfl: 0  •  budesonide-formoterol (SYMBICORT) 160-4.5 MCG/ACT inhaler, Inhale 2 puffs 2 (Two) Times a Day., Disp: , Rfl:   •  dicyclomine (BENTYL) 20 MG tablet, Take 1 tablet by mouth Every 6 (Six) Hours As Needed (abdominal cramping)., Disp: 120 tablet, Rfl: 2  •  famotidine (PEPCID) 40 MG tablet, Take 1 tablet by mouth Daily., Disp: 30 tablet, Rfl: 0  •  omeprazole (priLOSEC) 40 MG capsule, Take 1 capsule by mouth Daily., Disp: 90 capsule, Rfl: 3  •  ondansetron ODT (ZOFRAN-ODT) 4 MG disintegrating tablet, Place 1 tablet on the tongue Every 8 (Eight) Hours As Needed for Nausea or Vomiting., Disp: 20 tablet, Rfl: 3    ALLERGIES  Bromfed dm [[id number]] and Wasp venom    VITALS  Vitals:    10/28/20 0921   Temp: 98.5 °F (36.9 °C)   TempSrc: Temporal   Weight: 85 kg (187 lb 6.4 oz)   Height: 177.8 cm (70\")       PHYSICAL EXAM  Debilities/Disabilities Identified: None  Emotional Behavior: Appropriate  Wt Readings from Last 3 Encounters:   10/28/20 85 kg (187 lb 6.4 oz)   10/24/20 86.2 kg (190 lb)   09/03/19 95.3 kg (210 lb)     Ht Readings from Last 1 Encounters:   10/28/20 177.8 cm (70\")     Body mass index is 26.89 kg/m².  Physical Exam  Vitals signs and nursing note reviewed.   Constitutional:       Appearance: He is well-developed.   HENT:      Head: Normocephalic and atraumatic.   Eyes:      " Conjunctiva/sclera: Conjunctivae normal.      Pupils: Pupils are equal, round, and reactive to light.   Neck:      Musculoskeletal: Normal range of motion and neck supple.   Cardiovascular:      Rate and Rhythm: Normal rate and regular rhythm.   Pulmonary:      Effort: Pulmonary effort is normal.      Breath sounds: Normal breath sounds.   Abdominal:      General: Bowel sounds are normal. There is no distension.      Palpations: Abdomen is soft.      Tenderness: There is abdominal tenderness in the right lower quadrant, epigastric area and left lower quadrant.   Musculoskeletal: Normal range of motion.   Lymphadenopathy:      Cervical: No cervical adenopathy.   Skin:     General: Skin is warm and dry.   Neurological:      Mental Status: He is alert and oriented to person, place, and time.   Psychiatric:         Behavior: Behavior normal.         CLINICAL DATA REVIEWED   reviewed previous lab results and integrated with today's visit, reviewed notes from other physicians and/or last GI encounter, reviewed previous endoscopy results and available photos, reviewed surgical pathology results from previous biopsies    ASSESSMENT  Diagnoses and all orders for this visit:    Dyspepsia  -     ondansetron ODT (ZOFRAN-ODT) 4 MG disintegrating tablet; Place 1 tablet on the tongue Every 8 (Eight) Hours As Needed for Nausea or Vomiting.  -     omeprazole (priLOSEC) 40 MG capsule; Take 1 capsule by mouth Daily.  -     Case Request; Standing  -     Follow Anesthesia Guidelines / Protocol; Future  -     Obtain Informed Consent; Standing  -     Case Request    Nausea and vomiting, intractability of vomiting not specified, unspecified vomiting type  -     ondansetron ODT (ZOFRAN-ODT) 4 MG disintegrating tablet; Place 1 tablet on the tongue Every 8 (Eight) Hours As Needed for Nausea or Vomiting.  -     omeprazole (priLOSEC) 40 MG capsule; Take 1 capsule by mouth Daily.  -     Clostridium Difficile EIA - Stool, Per Rectum; Future  -      Gastrointestinal Panel, PCR - Stool, Per Rectum; Future  -     Case Request; Standing  -     Follow Anesthesia Guidelines / Protocol; Future  -     Obtain Informed Consent; Standing  -     Case Request    Blood in stool  -     Clostridium Difficile EIA - Stool, Per Rectum; Future  -     Gastrointestinal Panel, PCR - Stool, Per Rectum; Future  -     Case Request; Standing  -     Follow Anesthesia Guidelines / Protocol; Future  -     Obtain Informed Consent; Standing  -     Case Request    Abdominal cramping  -     Clostridium Difficile EIA - Stool, Per Rectum; Future  -     Gastrointestinal Panel, PCR - Stool, Per Rectum; Future  -     Case Request; Standing  -     Follow Anesthesia Guidelines / Protocol; Future  -     Obtain Informed Consent; Standing  -     Case Request  -     dicyclomine (BENTYL) 20 MG tablet; Take 1 tablet by mouth Every 6 (Six) Hours As Needed (abdominal cramping).    Diarrhea of presumed infectious origin  -     Clostridium Difficile EIA - Stool, Per Rectum; Future  -     Gastrointestinal Panel, PCR - Stool, Per Rectum; Future  -     Case Request; Standing  -     Follow Anesthesia Guidelines / Protocol; Future  -     Obtain Informed Consent; Standing  -     Case Request  -     dicyclomine (BENTYL) 20 MG tablet; Take 1 tablet by mouth Every 6 (Six) Hours As Needed (abdominal cramping).          PLAN  Return in about 2 months (around 12/28/2020).   No evidence of GB or colitis on CT.    EGD and colonoscopy asap.     Omeprazole 40mg by mouth once daily, Dicyclomine (Bentyl) 20mg by mouth every 6 hours as needed for diarrhea and abdominal, zofran for nausea.  And use over the counter immodium for diarrhea as needed.   Don't eat late, don't eat fried and don't eat too much at one time. Avoid tomato based products like pizza, lasagna, spagetti.  If you want to have these take an over the counter Pepcid immediately before you eat them.  Do not eat within 3-4 hrs of bedtime. Limit caffeine and  carbonated beverages, if you must have them do not have later in the day.  If reflux is severe elevate the head of the bed. You may also use TUMS, maalox, or mylanta for breakthrough heartburn.    Take a daily probiotic for your gut as well.  AVOID taking NSAIDS (like ibuprofen, Aleve, Motrin, naproxen, meloxicam, etc) as much as possible and use acetaminophen (Tylenol) instead.    Drink lots of water, eat a high fiber diet with 25-30gm a day(check the fiber content in the foods you eat.  Protein bars with 15gm of fiber in each bar are a great supplement daily), and get regular exercise.     High Fiber Food suggestions:    Fonseca Protein bars from [employer] = 15gm of fiber in each bar (also gluten free)  Quest Protein bars from grocery [employer]= 15gm of fiber each (also gluten free)  Fiber One bars from grocery stores = 5-6gm of fiber each  [employer] Bran Buds cereal 1/2 cup = 17gm of fiber      I have discussed the above plan with the patient.  They verbalize understanding and are in agreement with the plan.  They have been advised to contact the office for any questions, concerns, or changes related to their health.

## 2020-10-29 ENCOUNTER — LAB (OUTPATIENT)
Dept: LAB | Facility: HOSPITAL | Age: 35
End: 2020-10-29

## 2020-10-29 DIAGNOSIS — K92.1 BLOOD IN STOOL: ICD-10-CM

## 2020-10-29 DIAGNOSIS — R11.2 NAUSEA AND VOMITING, INTRACTABILITY OF VOMITING NOT SPECIFIED, UNSPECIFIED VOMITING TYPE: ICD-10-CM

## 2020-10-29 DIAGNOSIS — R10.9 ABDOMINAL CRAMPING: ICD-10-CM

## 2020-10-29 DIAGNOSIS — R19.7 DIARRHEA OF PRESUMED INFECTIOUS ORIGIN: ICD-10-CM

## 2020-10-29 PROCEDURE — 0097U HC BIOFIRE FILMARRAY GI PANEL: CPT
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
## 2020-10-30 ENCOUNTER — ANESTHESIA EVENT (OUTPATIENT)
Dept: PERIOP | Facility: HOSPITAL | Age: 35
End: 2020-10-30

## 2020-10-30 ENCOUNTER — LAB (OUTPATIENT)
Dept: LAB | Facility: HOSPITAL | Age: 35
End: 2020-10-30

## 2020-10-30 DIAGNOSIS — Z01.818 OTHER SPECIFIED PRE-OPERATIVE EXAMINATION: ICD-10-CM

## 2020-10-30 LAB
ADV 40+41 DNA STL QL NAA+NON-PROBE: NOT DETECTED
ASTRO TYP 1-8 RNA STL QL NAA+NON-PROBE: NOT DETECTED
C CAYETANENSIS DNA STL QL NAA+NON-PROBE: NOT DETECTED
CAMPY SP DNA.DIARRHEA STL QL NAA+PROBE: NOT DETECTED
CRYPTOSP STL CULT: NOT DETECTED
E COLI DNA SPEC QL NAA+PROBE: NOT DETECTED
E HISTOLYT AG STL-ACNC: NOT DETECTED
EAEC PAA PLAS AGGR+AATA ST NAA+NON-PRB: NOT DETECTED
EC STX1 + STX2 GENES STL NAA+PROBE: NOT DETECTED
EPEC EAE GENE STL QL NAA+NON-PROBE: NOT DETECTED
ETEC LTA+ST1A+ST1B TOX ST NAA+NON-PROBE: NOT DETECTED
G LAMBLIA DNA SPEC QL NAA+PROBE: NOT DETECTED
NOROVIRUS GI+II RNA STL QL NAA+NON-PROBE: NOT DETECTED
P SHIGELLOIDES DNA STL QL NAA+PROBE: NOT DETECTED
RV RNA STL NAA+PROBE: NOT DETECTED
SALMONELLA DNA SPEC QL NAA+PROBE: NOT DETECTED
SAPO I+II+IV+V RNA STL QL NAA+NON-PROBE: NOT DETECTED
SHIGELLA SP+EIEC IPAH STL QL NAA+PROBE: NOT DETECTED
V CHOLERAE DNA SPEC QL NAA+PROBE: NOT DETECTED
VIBRIO DNA SPEC NAA+PROBE: NOT DETECTED
YERSINIA STL CULT: NOT DETECTED

## 2020-10-30 PROCEDURE — U0004 COV-19 TEST NON-CDC HGH THRU: HCPCS | Performed by: OBSTETRICS & GYNECOLOGY

## 2020-10-30 PROCEDURE — C9803 HOPD COVID-19 SPEC COLLECT: HCPCS

## 2020-10-31 LAB — SARS-COV-2 RNA RESP QL NAA+PROBE: NOT DETECTED

## 2020-11-02 ENCOUNTER — HOSPITAL ENCOUNTER (OUTPATIENT)
Facility: HOSPITAL | Age: 35
Setting detail: HOSPITAL OUTPATIENT SURGERY
Discharge: HOME | End: 2020-11-02
Attending: INTERNAL MEDICINE | Admitting: INTERNAL MEDICINE

## 2020-11-02 ENCOUNTER — ANESTHESIA (OUTPATIENT)
Dept: PERIOP | Facility: HOSPITAL | Age: 35
End: 2020-11-02

## 2020-11-02 VITALS
WEIGHT: 181.8 LBS | DIASTOLIC BLOOD PRESSURE: 78 MMHG | HEART RATE: 68 BPM | RESPIRATION RATE: 20 BRPM | TEMPERATURE: 97.7 F | OXYGEN SATURATION: 98 % | BODY MASS INDEX: 26.09 KG/M2 | SYSTOLIC BLOOD PRESSURE: 125 MMHG

## 2020-11-02 DIAGNOSIS — K92.1 BLOOD IN STOOL: ICD-10-CM

## 2020-11-02 DIAGNOSIS — R19.7 DIARRHEA OF PRESUMED INFECTIOUS ORIGIN: ICD-10-CM

## 2020-11-02 DIAGNOSIS — R10.13 DYSPEPSIA: ICD-10-CM

## 2020-11-02 DIAGNOSIS — R11.2 NAUSEA AND VOMITING, INTRACTABILITY OF VOMITING NOT SPECIFIED, UNSPECIFIED VOMITING TYPE: ICD-10-CM

## 2020-11-02 DIAGNOSIS — R10.9 ABDOMINAL CRAMPING: ICD-10-CM

## 2020-11-02 PROCEDURE — 43239 EGD BIOPSY SINGLE/MULTIPLE: CPT | Performed by: INTERNAL MEDICINE

## 2020-11-02 PROCEDURE — 45378 DIAGNOSTIC COLONOSCOPY: CPT | Performed by: INTERNAL MEDICINE

## 2020-11-02 PROCEDURE — 25010000002 PROPOFOL 10 MG/ML EMULSION: Performed by: NURSE ANESTHETIST, CERTIFIED REGISTERED

## 2020-11-02 PROCEDURE — 88305 TISSUE EXAM BY PATHOLOGIST: CPT | Performed by: INTERNAL MEDICINE

## 2020-11-02 RX ORDER — PROPOFOL 10 MG/ML
VIAL (ML) INTRAVENOUS AS NEEDED
Status: DISCONTINUED | OUTPATIENT
Start: 2020-11-02 | End: 2020-11-02 | Stop reason: SURG

## 2020-11-02 RX ORDER — ONDANSETRON 2 MG/ML
4 INJECTION INTRAMUSCULAR; INTRAVENOUS ONCE AS NEEDED
Status: DISCONTINUED | OUTPATIENT
Start: 2020-11-02 | End: 2020-11-02 | Stop reason: HOSPADM

## 2020-11-02 RX ORDER — LIDOCAINE HYDROCHLORIDE 10 MG/ML
0.5 INJECTION, SOLUTION EPIDURAL; INFILTRATION; INTRACAUDAL; PERINEURAL ONCE AS NEEDED
Status: DISCONTINUED | OUTPATIENT
Start: 2020-11-02 | End: 2020-11-02 | Stop reason: HOSPADM

## 2020-11-02 RX ORDER — SODIUM CHLORIDE 0.9 % (FLUSH) 0.9 %
10 SYRINGE (ML) INJECTION AS NEEDED
Status: DISCONTINUED | OUTPATIENT
Start: 2020-11-02 | End: 2020-11-02 | Stop reason: HOSPADM

## 2020-11-02 RX ORDER — LIDOCAINE HYDROCHLORIDE 20 MG/ML
INJECTION, SOLUTION INFILTRATION; PERINEURAL AS NEEDED
Status: DISCONTINUED | OUTPATIENT
Start: 2020-11-02 | End: 2020-11-02 | Stop reason: SURG

## 2020-11-02 RX ORDER — SODIUM CHLORIDE, SODIUM LACTATE, POTASSIUM CHLORIDE, CALCIUM CHLORIDE 600; 310; 30; 20 MG/100ML; MG/100ML; MG/100ML; MG/100ML
100 INJECTION, SOLUTION INTRAVENOUS CONTINUOUS
Status: DISCONTINUED | OUTPATIENT
Start: 2020-11-02 | End: 2020-11-02 | Stop reason: HOSPADM

## 2020-11-02 RX ORDER — SODIUM CHLORIDE 9 MG/ML
40 INJECTION, SOLUTION INTRAVENOUS AS NEEDED
Status: DISCONTINUED | OUTPATIENT
Start: 2020-11-02 | End: 2020-11-02 | Stop reason: HOSPADM

## 2020-11-02 RX ORDER — SODIUM CHLORIDE 0.9 % (FLUSH) 0.9 %
10 SYRINGE (ML) INJECTION EVERY 12 HOURS SCHEDULED
Status: DISCONTINUED | OUTPATIENT
Start: 2020-11-02 | End: 2020-11-02 | Stop reason: HOSPADM

## 2020-11-02 RX ORDER — SODIUM CHLORIDE, SODIUM LACTATE, POTASSIUM CHLORIDE, CALCIUM CHLORIDE 600; 310; 30; 20 MG/100ML; MG/100ML; MG/100ML; MG/100ML
9 INJECTION, SOLUTION INTRAVENOUS CONTINUOUS
Status: DISCONTINUED | OUTPATIENT
Start: 2020-11-02 | End: 2020-11-02 | Stop reason: HOSPADM

## 2020-11-02 RX ADMIN — PROPOFOL 50 MG: 10 INJECTION, EMULSION INTRAVENOUS at 09:45

## 2020-11-02 RX ADMIN — PROPOFOL 50 MG: 10 INJECTION, EMULSION INTRAVENOUS at 09:57

## 2020-11-02 RX ADMIN — PROPOFOL 50 MG: 10 INJECTION, EMULSION INTRAVENOUS at 09:51

## 2020-11-02 RX ADMIN — PROPOFOL 50 MG: 10 INJECTION, EMULSION INTRAVENOUS at 09:42

## 2020-11-02 RX ADMIN — PROPOFOL 50 MG: 10 INJECTION, EMULSION INTRAVENOUS at 09:49

## 2020-11-02 RX ADMIN — LIDOCAINE HYDROCHLORIDE 100 MG: 20 INJECTION, SOLUTION INFILTRATION; PERINEURAL at 09:34

## 2020-11-02 RX ADMIN — SODIUM CHLORIDE, POTASSIUM CHLORIDE, SODIUM LACTATE AND CALCIUM CHLORIDE 9 ML/HR: 600; 310; 30; 20 INJECTION, SOLUTION INTRAVENOUS at 08:23

## 2020-11-02 RX ADMIN — PROPOFOL 50 MG: 10 INJECTION, EMULSION INTRAVENOUS at 09:43

## 2020-11-02 RX ADMIN — PROPOFOL 100 MG: 10 INJECTION, EMULSION INTRAVENOUS at 09:40

## 2020-11-02 RX ADMIN — PROPOFOL 50 MG: 10 INJECTION, EMULSION INTRAVENOUS at 10:00

## 2020-11-02 RX ADMIN — PROPOFOL 50 MG: 10 INJECTION, EMULSION INTRAVENOUS at 10:02

## 2020-11-02 RX ADMIN — PROPOFOL 50 MG: 10 INJECTION, EMULSION INTRAVENOUS at 09:54

## 2020-11-02 RX ADMIN — PROPOFOL 50 MG: 10 INJECTION, EMULSION INTRAVENOUS at 09:47

## 2020-11-02 NOTE — BRIEF OP NOTE
ESOPHAGOGASTRODUODENOSCOPY, COLONOSCOPY  Progress Note    Garry Krishnandipika  11/2/2020    Pre-op Diagnosis:   Dyspepsia [R10.13]  Nausea and vomiting, intractability of vomiting not specified, unspecified vomiting type [R11.2]  Blood in stool [K92.1]  Abdominal cramping [R10.9]  Diarrhea of presumed infectious origin [R19.7]       Post-Op Diagnosis Codes:     * Dyspepsia [R10.13]     * Nausea and vomiting, intractability of vomiting not specified, unspecified vomiting type [R11.2]     * Blood in stool [K92.1]     * Abdominal cramping [R10.9]     * Diarrhea of presumed infectious origin [R19.7]     * Duodenitis [535.6]     * Reflux esophagitis [K21.00]    Procedure/CPT® Codes:        Procedure(s):  ESOPHAGOGASTRODUODENOSCOPY WITH BIOPSIES  COLONOSCOPY    Surgeon(s):  Jaylen Borden MD    Anesthesia: Monitored Anesthesia Care    Staff:   Circulator: Cecile Burton RN  Scrub Person: Marylin Trevino         Estimated Blood Loss: none    Urine Voided: * No values recorded between 11/2/2020  9:33 AM and 11/2/2020 10:08 AM *    Specimens:                Specimens     ID Source Type Tests Collected By Collected At Frozen?      A Small Intestine, Duodenum Tissue · TISSUE PATHOLOGY EXAM   Jaylen Borden MD 11/2/20 0932      Description: bx    B Stomach Tissue · TISSUE PATHOLOGY EXAM   Jaylen Borden MD 11/2/20 0956      Description: bx    C Esophagus, Distal Tissue · TISSUE PATHOLOGY EXAM   Jaylen Borden MD 11/2/20 0994      Description: bx                Drains: * No LDAs found *    Findings: Mild Duodenitis w Scalloping-Biopsy  Normal Stomach - Biopsy  Reflux Esophagitis-Biopsy    Colon to TI good Prep  Normal Mucosa thru-out    Complications: None          Jaylen Borden MD     Date: 11/2/2020  Time: 10:09 EST       Avascular necrosis of femur head, right

## 2020-11-02 NOTE — ANESTHESIA POSTPROCEDURE EVALUATION
Patient: Garry Alcantara    Procedure Summary     Date: 11/02/20 Room / Location: [facility] ENDOSCOPY 1 /  [facility]    Anesthesia Start: 0931 Anesthesia Stop: 1008    Procedures:       ESOPHAGOGASTRODUODENOSCOPY WITH BIOPSIES (N/A Esophagus)      COLONOSCOPY (N/A ) Diagnosis:       Dyspepsia      Nausea and vomiting, intractability of vomiting not specified, unspecified vomiting type      Blood in stool      Abdominal cramping      Diarrhea of presumed infectious origin      Duodenitis      Reflux esophagitis      (Dyspepsia [R10.13])      (Nausea and vomiting, intractability of vomiting not specified, unspecified vomiting type [R11.2])      (Blood in stool [K92.1])      (Abdominal cramping [R10.9])      (Diarrhea of presumed infectious origin [R19.7])    Surgeon: Jaylen Borden MD Provider: Monica Tapia CRNA    Anesthesia Type: MAC ASA Status: 2          Anesthesia Type: MAC    Vitals  Vitals Value Taken Time   BP 99/64 11/02/20 1010   Temp 97.7 °F (36.5 °C) 11/02/20 1009   Pulse 84 11/02/20 1010   Resp 20 11/02/20 1010   SpO2 97 % 11/02/20 1010           Post Anesthesia Care and Evaluation    Patient location during evaluation: bedside  Patient participation: complete - patient participated  Level of consciousness: awake and alert  Pain score: 0  Pain management: adequate  Airway patency: patent  Anesthetic complications: No anesthetic complications  PONV Status: none  Cardiovascular status: acceptable  Respiratory status: acceptable  Hydration status: acceptable  No anesthesia care post op

## 2020-11-02 NOTE — ANESTHESIA PREPROCEDURE EVALUATION
Anesthesia Evaluation     Patient summary reviewed and Nursing notes reviewed   NPO Solid Status: > 8 hours  NPO Liquid Status: > 8 hours           Airway   Mallampati: II  TM distance: <3 FB  Neck ROM: full  No difficulty expected  Dental - normal exam     Pulmonary - normal exam   (+) asthma,  Cardiovascular - normal exam  Exercise tolerance: good (4-7 METS)    ECG reviewed        Neuro/Psych- negative ROS  GI/Hepatic/Renal/Endo    (+)  GERD well controlled,      Musculoskeletal (-) negative ROS    Abdominal  - normal exam   Substance History - negative use  (-) alcohol use     OB/GYN negative ob/gyn ROS         Other - negative ROS                       Anesthesia Plan    ASA 2     MAC     intravenous induction     Anesthetic plan, all risks, benefits, and alternatives have been provided, discussed and informed consent has been obtained with: patient.  Use of blood products discussed with patient  Consented to blood products.

## 2020-11-02 NOTE — OP NOTE
ESOPHAGOGASTRODUODENOSCOPY, COLONOSCOPY  Procedure Report    Patient Name:  Garry Alcantara  YOB: 1985    Date of Surgery:  11/2/2020     Indications:  Dyspepsia [R10.13]  Nausea and vomiting, intractability of vomiting not specified, unspecified vomiting type [R11.2]  Blood in stool [K92.1]  Abdominal cramping [R10.9]  Diarrhea of presumed infectious origin [R19.7]      Pre-op Diagnosis:   Dyspepsia [R10.13]  Nausea and vomiting, intractability of vomiting not specified, unspecified vomiting type [R11.2]  Blood in stool [K92.1]  Abdominal cramping [R10.9]  Diarrhea of presumed infectious origin [R19.7]    Post-Op Diagnosis Codes:     * Dyspepsia [R10.13]     * Nausea and vomiting, intractability of vomiting not specified, unspecified vomiting type [R11.2]     * Blood in stool [K92.1]     * Abdominal cramping [R10.9]     * Diarrhea of presumed infectious origin [R19.7]     * Duodenitis [535.6]     * Reflux esophagitis [K21.00]         Procedure/CPT® Codes:      Procedure(s):  ESOPHAGOGASTRODUODENOSCOPY WITH BIOPSIES  COLONOSCOPY    Staff:  Surgeon(s):  Jaylen Borden MD         Anesthesia: Monitored Anesthesia Care    Estimated Blood Loss: none    Specimens:   ID Type Source Tests Collected by Time   A : bx Tissue Small Intestine, Duodenum TISSUE PATHOLOGY EXAM Jaylen Borden MD 11/2/2020 0946   B : bx Tissue Stomach TISSUE PATHOLOGY EXAM Jaylen Broden MD 11/2/2020 0948   C : bx Tissue Esophagus, Distal TISSUE PATHOLOGY EXAM Jaylen Borden MD 11/2/2020 0949       Implants:    Nothing was implanted during the procedure      Description of Procedure: After having signed informed consent, he was brought to the endoscopy suite and placed in the left lateral decubitus position and given his IV sedation.  A bite block was placed between his incisors.  The scope was introduced into the oropharynx and advanced under direct visualization into the  esophagus, through to the distal esophagus.  The Z-line was only mildly irregular. There was no active ulcer or stricture.  The scope was advanced into the stomach and retroflexed revealing normal cardia and fundus.   The scope was deretroflexed and advanced into the antrum.  The antral mucosa was normal appearing.  The scope was advanced through the pylorus to the duodenal bulb.  In the duodenal bulb there were scattered areas of petechial erythema.  The scope was advanced around the angle to the 2nd and 3rd portions of the duodenum.  There were scattered areas of flattened bullae and erythema within the 2nd and 3rd portions and mild scalloping.  Biopsies were taken to rule out celiac disease.  The scope was withdrawn back into the antrum.  Biopsies were taken of a relatively normal appearing antrum to rule out H. pylori. The scope was withdrawn into the distal esophagus and biopsies were taken of the distal esophagus to rule out persistent reflux esophagitis on his present daily dose of proton pump inhibitor. As the scope was withdrawn the remainder of the esophagus was easily distended and normal appearing.  The scope was taken from the patient.  He tolerated that procedure well.      The patient was kept in the left lateral decubitus position and repositioned in the room.  Rectal exam revealed no external lesions, normal anal tone and normal palpable prostate.   The scope was introduced into the rectum and advanced under direct visualization with ease through the rectum, sigmoid colon, descending colon, to and around the splenic flexure, reduced and advanced through the transverse colon.  Overall, the colon was fairly well prepped.  There was a significant amount of adherent liquid stool present to the walls, but multiple flushes were used to clear this.  The scope was able to be advanced through the transverse colon, hepatic flexure, and into the ascending colon, reduced and advanced into the cecum.  The cecum  was identified by the appendiceal orifice and the ileocecal valve.  It was well distended and normal appearing.  The ileocecal valve was intubated.  The terminal ileum was intubated.  In the distal 5-10 cm of the distal terminal ileum was normal as well.  The scope was withdrawn back into the ascending colon, withdrawn slowly examining the colon in a circumferential fashion.  The colonic mucosa was normal throughout.  The scope was retroflexed within the rectum revealing intact dentate line, no mucosal lesions.  The scope was deretroflexed and withdrawn.  The patient tolerated both procedures well.          Findings: Mild Duodenitis w Scalloping-Biopsy  Normal Stomach - Biopsy  Reflux Esophagitis-Biopsy    Colon to TI good Prep  Normal Mucosa thru-out         Complications: None    Recommendations: Results to be called.  Continue Daily PPI Consider Biliary W/U      Jaylen Borden MD     Date: 11/2/2020  Time: 10:10 EST

## 2020-11-02 NOTE — INTERVAL H&P NOTE
Vital Signs  /78   Pulse 68   Temp 97.7 °F (36.5 °C) (Infrared)   Resp 20   Wt 82.5 kg (181 lb 12.8 oz)   SpO2 98%   BMI 26.09 kg/m²     H&P reviewed. The patient was examined and there are no changes to the H&P.

## 2020-11-03 ENCOUNTER — APPOINTMENT (OUTPATIENT)
Dept: CT IMAGING | Facility: HOSPITAL | Age: 35
End: 2020-11-03

## 2020-11-03 ENCOUNTER — APPOINTMENT (OUTPATIENT)
Dept: GENERAL RADIOLOGY | Facility: HOSPITAL | Age: 35
End: 2020-11-03

## 2020-11-03 ENCOUNTER — HOSPITAL ENCOUNTER (EMERGENCY)
Facility: HOSPITAL | Age: 35
Discharge: HOME | End: 2020-11-03
Attending: EMERGENCY MEDICINE | Admitting: EMERGENCY MEDICINE

## 2020-11-03 VITALS
RESPIRATION RATE: 18 BRPM | DIASTOLIC BLOOD PRESSURE: 84 MMHG | WEIGHT: 193.3 LBS | TEMPERATURE: 98.3 F | BODY MASS INDEX: 27.67 KG/M2 | OXYGEN SATURATION: 96 % | SYSTOLIC BLOOD PRESSURE: 152 MMHG | HEART RATE: 84 BPM | HEIGHT: 70 IN

## 2020-11-03 DIAGNOSIS — R10.84 GENERALIZED ABDOMINAL PAIN: Primary | ICD-10-CM

## 2020-11-03 DIAGNOSIS — K29.70 GASTRITIS, PRESENCE OF BLEEDING UNSPECIFIED, UNSPECIFIED CHRONICITY, UNSPECIFIED GASTRITIS TYPE: ICD-10-CM

## 2020-11-03 DIAGNOSIS — R10.13 DYSPEPSIA: Primary | ICD-10-CM

## 2020-11-03 LAB
ALBUMIN SERPL-MCNC: 4.4 G/DL (ref 3.5–5.2)
ALBUMIN/GLOB SERPL: 1.5 G/DL
ALP SERPL-CCNC: 42 U/L (ref 39–117)
ALT SERPL W P-5'-P-CCNC: 21 U/L (ref 1–41)
ANION GAP SERPL CALCULATED.3IONS-SCNC: 15 MMOL/L (ref 5–15)
AST SERPL-CCNC: 17 U/L (ref 1–40)
BASOPHILS # BLD AUTO: 0.05 10*3/MM3 (ref 0–0.2)
BASOPHILS NFR BLD AUTO: 0.7 % (ref 0–1.5)
BILIRUB SERPL-MCNC: 0.6 MG/DL (ref 0–1.2)
BILIRUB UR QL STRIP: NEGATIVE
BUN SERPL-MCNC: 9 MG/DL (ref 6–20)
BUN/CREAT SERPL: 8.3 (ref 7–25)
CALCIUM SPEC-SCNC: 9.8 MG/DL (ref 8.6–10.5)
CHLORIDE SERPL-SCNC: 102 MMOL/L (ref 98–107)
CLARITY UR: CLEAR
CO2 SERPL-SCNC: 22 MMOL/L (ref 22–29)
COLOR UR: YELLOW
CREAT SERPL-MCNC: 1.09 MG/DL (ref 0.76–1.27)
D DIMER PPP FEU-MCNC: <0.27 MCGFEU/ML (ref 0–0.46)
DEPRECATED RDW RBC AUTO: 40.4 FL (ref 37–54)
EOSINOPHIL # BLD AUTO: 0.04 10*3/MM3 (ref 0–0.4)
EOSINOPHIL NFR BLD AUTO: 0.6 % (ref 0.3–6.2)
ERYTHROCYTE [DISTWIDTH] IN BLOOD BY AUTOMATED COUNT: 12.8 % (ref 12.3–15.4)
GFR SERPL CREATININE-BSD FRML MDRD: 77 ML/MIN/1.73
GLOBULIN UR ELPH-MCNC: 2.9 GM/DL
GLUCOSE SERPL-MCNC: 104 MG/DL (ref 65–99)
GLUCOSE UR STRIP-MCNC: NEGATIVE MG/DL
HCT VFR BLD AUTO: 48.4 % (ref 37.5–51)
HGB BLD-MCNC: 16 G/DL (ref 13–17.7)
HGB UR QL STRIP.AUTO: NEGATIVE
IMM GRANULOCYTES # BLD AUTO: 0.01 10*3/MM3 (ref 0–0.05)
IMM GRANULOCYTES NFR BLD AUTO: 0.1 % (ref 0–0.5)
KETONES UR QL STRIP: NEGATIVE
LAB AP CASE REPORT: NORMAL
LEUKOCYTE ESTERASE UR QL STRIP.AUTO: NEGATIVE
LIPASE SERPL-CCNC: 50 U/L (ref 13–60)
LYMPHOCYTES # BLD AUTO: 2.47 10*3/MM3 (ref 0.7–3.1)
LYMPHOCYTES NFR BLD AUTO: 35.5 % (ref 19.6–45.3)
MCH RBC QN AUTO: 28.7 PG (ref 26.6–33)
MCHC RBC AUTO-ENTMCNC: 33.1 G/DL (ref 31.5–35.7)
MCV RBC AUTO: 86.7 FL (ref 79–97)
MONOCYTES # BLD AUTO: 0.73 10*3/MM3 (ref 0.1–0.9)
MONOCYTES NFR BLD AUTO: 10.5 % (ref 5–12)
NEUTROPHILS NFR BLD AUTO: 3.66 10*3/MM3 (ref 1.7–7)
NEUTROPHILS NFR BLD AUTO: 52.6 % (ref 42.7–76)
NITRITE UR QL STRIP: NEGATIVE
NRBC BLD AUTO-RTO: 0 /100 WBC (ref 0–0.2)
PATH REPORT.FINAL DX SPEC: NORMAL
PATH REPORT.GROSS SPEC: NORMAL
PH UR STRIP.AUTO: 7 [PH] (ref 4.5–8)
PLATELET # BLD AUTO: 269 10*3/MM3 (ref 140–450)
PMV BLD AUTO: 10.1 FL (ref 6–12)
POTASSIUM SERPL-SCNC: 3.3 MMOL/L (ref 3.5–5.2)
PROT SERPL-MCNC: 7.3 G/DL (ref 6–8.5)
PROT UR QL STRIP: NEGATIVE
RBC # BLD AUTO: 5.58 10*6/MM3 (ref 4.14–5.8)
SODIUM SERPL-SCNC: 139 MMOL/L (ref 136–145)
SP GR UR STRIP: <=1.005 (ref 1–1.03)
TROPONIN T SERPL-MCNC: <0.01 NG/ML (ref 0–0.03)
UROBILINOGEN UR QL STRIP: NORMAL
WBC # BLD AUTO: 6.96 10*3/MM3 (ref 3.4–10.8)

## 2020-11-03 PROCEDURE — 83690 ASSAY OF LIPASE: CPT | Performed by: PHYSICIAN ASSISTANT

## 2020-11-03 PROCEDURE — 99283 EMERGENCY DEPT VISIT LOW MDM: CPT | Performed by: EMERGENCY MEDICINE

## 2020-11-03 PROCEDURE — 99284 EMERGENCY DEPT VISIT MOD MDM: CPT

## 2020-11-03 PROCEDURE — 0 IOPAMIDOL PER 1 ML: Performed by: EMERGENCY MEDICINE

## 2020-11-03 PROCEDURE — 85379 FIBRIN DEGRADATION QUANT: CPT | Performed by: PHYSICIAN ASSISTANT

## 2020-11-03 PROCEDURE — 80053 COMPREHEN METABOLIC PANEL: CPT | Performed by: PHYSICIAN ASSISTANT

## 2020-11-03 PROCEDURE — 84484 ASSAY OF TROPONIN QUANT: CPT | Performed by: PHYSICIAN ASSISTANT

## 2020-11-03 PROCEDURE — 85025 COMPLETE CBC W/AUTO DIFF WBC: CPT | Performed by: PHYSICIAN ASSISTANT

## 2020-11-03 PROCEDURE — 0 DIATRIZOATE MEGLUMINE & SODIUM PER 1 ML: Performed by: EMERGENCY MEDICINE

## 2020-11-03 PROCEDURE — 81003 URINALYSIS AUTO W/O SCOPE: CPT | Performed by: PHYSICIAN ASSISTANT

## 2020-11-03 PROCEDURE — 74177 CT ABD & PELVIS W/CONTRAST: CPT

## 2020-11-03 PROCEDURE — 93010 ELECTROCARDIOGRAM REPORT: CPT | Performed by: INTERNAL MEDICINE

## 2020-11-03 PROCEDURE — 71046 X-RAY EXAM CHEST 2 VIEWS: CPT

## 2020-11-03 PROCEDURE — 93005 ELECTROCARDIOGRAM TRACING: CPT | Performed by: PHYSICIAN ASSISTANT

## 2020-11-03 RX ORDER — ALUMINA, MAGNESIA, AND SIMETHICONE 2400; 2400; 240 MG/30ML; MG/30ML; MG/30ML
15 SUSPENSION ORAL ONCE
Status: COMPLETED | OUTPATIENT
Start: 2020-11-03 | End: 2020-11-03

## 2020-11-03 RX ORDER — LIDOCAINE HYDROCHLORIDE 20 MG/ML
15 SOLUTION OROPHARYNGEAL ONCE
Status: COMPLETED | OUTPATIENT
Start: 2020-11-03 | End: 2020-11-03

## 2020-11-03 RX ORDER — SUCRALFATE 1 G/1
1 TABLET ORAL 3 TIMES DAILY
Qty: 21 TABLET | Refills: 0 | Status: SHIPPED | OUTPATIENT
Start: 2020-11-03 | End: 2020-11-10

## 2020-11-03 RX ADMIN — ALUMINUM HYDROXIDE, MAGNESIUM HYDROXIDE, AND DIMETHICONE 15 ML: 400; 400; 40 SUSPENSION ORAL at 22:21

## 2020-11-03 RX ADMIN — SODIUM CHLORIDE 1000 ML: 9 INJECTION, SOLUTION INTRAVENOUS at 20:12

## 2020-11-03 RX ADMIN — IOPAMIDOL 100 ML: 755 INJECTION, SOLUTION INTRAVENOUS at 21:55

## 2020-11-03 RX ADMIN — DIATRIZOATE MEGLUMINE AND DIATRIZOATE SODIUM 30 ML: 600; 100 SOLUTION ORAL; RECTAL at 21:55

## 2020-11-03 RX ADMIN — LIDOCAINE HYDROCHLORIDE 15 ML: 20 SOLUTION ORAL; TOPICAL at 22:21

## 2020-11-04 LAB — QT INTERVAL: 404 MS

## 2020-11-04 NOTE — ED NOTES
Iw[place], Patient Mother sent to wait in vehicle.  Phone number [phone]     Bill Marroquin RN  11/03/20 192

## 2020-11-04 NOTE — DISCHARGE INSTRUCTIONS
Recommend gentle diet and plenty of fluids as tolerated.  Please return to the emergency room for any worsening pain, fevers, nausea, vomiting, bleeding or any other concerns.

## 2020-11-04 NOTE — ED PROVIDER NOTES
EMERGENCY DEPARTMENT ENCOUNTER      Room Number: 4/04    History is provided by the patient, no translation services needed    HPI:    Chief complaint: Lightheadedness, chest pain, abdominal pain, nausea, vomiting    Location: Left chest radiating to left shoulder/arm and into left thoracic back.  Generalized abdominal pain.    Quality/Severity: 6/10, burning    Timing/Duration: Chest pain, abdominal pain, nausea, vomiting has been intermittent since September.  Patient states he has experienced frequent episodes of lightheadedness as well, patient had a few near syncopal episodes today and then began vomiting.  Chest pain and abdominal pain worsened this evening as well.    Modifying Factors: Patient states his lightheadedness resolves when he lies flat, worsens with positional changes.  Nothing seems to make the chest or abdominal pain better or worse.    Associated Symptoms: Positive for chest pain, abdominal pain, nausea, vomiting, lightheadedness, unintentional weight loss.  Denies syncope, leg pain or swelling, palpitations, cough, fever, chills, headache.  Denies any bowel movement since yesterday after colon prep.    Narrative: Pt is a 35 y.o. male who presents complaining of the above symptoms.  Patient states he has had ongoing symptoms since September, he states he has been having left-sided chest pain as well as generalized abdominal pain with nausea and vomiting and episodes of near syncope.  He was evaluated here in the ED on 10/24/2020 and was instructed to follow-up with Dr. Borden, which he did and had an EGD and colonoscopy yesterday.  He states over the past few months he has been dealing with intermittent bloody diarrhea, nausea, vomiting, and has had a 30 pound unintentional weight loss.  During his EGD he had a few biopsies performed, no biopsies in the colon per op note.  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  He states after one of these episodes of near syncope he began vomiting and had a small amount of blood present in his emesis.  He states his chest pain has also been intermittent for the last few months, he has not appreciated any pattern or aggravating factors.  This evening the chest pain and abdominal pain worsened.  When asked if he is experiencing any shortness of air he states he has asthma so he feels short of breath occasionally and it is hard for him to tell him, he denies any wheezing.  EMS gave Phenergan in route and he has had an improvement in his nausea but does feel sleepy at this time.  Patient states he is supposed to be taking medications prescribed by Dr. Borden, omeprazole and Bentyl, but he does not take these medications.      PMD: Nilesh Montoya MD    REVIEW OF SYSTEMS  Review of Systems   Constitutional: Positive for appetite change and unexpected weight change. Negative for chills and fever.   Respiratory: Positive for shortness of breath. Negative for cough.    Cardiovascular: Positive for chest pain. Negative for palpitations and leg swelling.   Gastrointestinal: Positive for abdominal pain, blood in stool (Intermittently for the past couple of months), diarrhea (Intermittently), nausea and vomiting.   Genitourinary: Negative for difficulty urinating and dysuria.   Musculoskeletal: Negative for arthralgias and joint swelling.   Skin: Negative for pallor and wound.   Neurological: Positive for light-headedness. Negative for dizziness, syncope, speech difficulty, weakness, numbness and headaches.   Psychiatric/Behavioral: Negative for confusion. The patient is not nervous/anxious.          PAST MEDICAL HISTORY  Active Ambulatory Problems     Diagnosis Date Noted   • Left lower quadrant pain 08/21/2017   • Left inguinal pain  08/21/2017   • Dyspepsia 10/28/2020   • Nausea and vomiting 10/28/2020   • Blood in stool 10/28/2020   • Abdominal cramping 10/28/2020   • Diarrhea of presumed infectious origin 10/28/2020     Resolved Ambulatory Problems     Diagnosis Date Noted   • No Resolved Ambulatory Problems     Past Medical History:   Diagnosis Date   • Asthma    • GERD (gastroesophageal reflux disease)        PAST SURGICAL HISTORY  Past Surgical History:   Procedure Laterality Date   • COLONOSCOPY N/A 11/2/2020    Procedure: COLONOSCOPY;  Surgeon: Jaylen Borden MD;  Location: [facility];  Service: Gastroenterology;  Laterality: N/A;  normal colon   • ENDOSCOPY N/A 11/2/2020    Procedure: ESOPHAGOGASTRODUODENOSCOPY WITH BIOPSIES;  Surgeon: Jaylen Borden MD;  Location: [facility] OR;  Service: Gastroenterology;  Laterality: N/A;  REFLUX  DUODENITIS   • FEMUR SURGERY Right    • HERNIA REPAIR     • WISDOM TOOTH EXTRACTION         FAMILY HISTORY  Family History   Problem Relation Age of Onset   • Hypertension Father    • Colon cancer Paternal Grandfather    • Cirrhosis Neg Hx        SOCIAL HISTORY  Social History     Socioeconomic History   • Marital status:      Spouse name: Not on file   • Number of children: Not on file   • Years of education: Not on file   • Highest education level: Not on file   Tobacco Use   • Smoking status: Never Smoker   • Smokeless tobacco: Never Used   Substance and Sexual Activity   • Alcohol use: Yes     Comment: socially   • Drug use: No       ALLERGIES  Bromfed dm [[id number]-[id number]] and Wasp venom    No current facility-administered medications for this encounter.     Current Outpatient Medications:   •  budesonide-formoterol (SYMBICORT) 160-4.5 MCG/ACT inhaler, Inhale 2 puffs 2 (Two) Times a Day., Disp: , Rfl:   •  famotidine (PEPCID) 40 MG tablet, Take 1 tablet by mouth Daily., Disp: 30 tablet, Rfl: 0  •  ondansetron ODT (ZOFRAN-ODT) 4 MG disintegrating tablet, Place 1 tablet  on the tongue Every 8 (Eight) Hours As Needed for Nausea or Vomiting., Disp: 20 tablet, Rfl: 3  •  albuterol sulfate  (90 Base) MCG/ACT inhaler, Inhale 2 puffs Every 4 (Four) Hours As Needed for Wheezing or Shortness of Air., Disp: 1 inhaler, Rfl: 0  •  dicyclomine (BENTYL) 20 MG tablet, Take 1 tablet by mouth Every 6 (Six) Hours As Needed (abdominal cramping)., Disp: 120 tablet, Rfl: 2  •  omeprazole (priLOSEC) 40 MG capsule, Take 1 capsule by mouth Daily., Disp: 90 capsule, Rfl: 3  •  sucralfate (CARAFATE) 1 g tablet, Take 1 tablet by mouth 3 (Three) Times a Day for 7 days., Disp: 21 tablet, Rfl: 0    PHYSICAL EXAM  ED Triage Vitals [11/03/20 1930]   Temp Heart Rate Resp BP SpO2   98.3 °F (36.8 °C) 98 18 166/86 99 %      Temp src Heart Rate Source Patient Position BP Location FiO2 (%)   Oral Monitor Lying Right arm --       Physical Exam   Constitutional: He is oriented to person, place, and time and well-developed, well-nourished, and in no distress. Vital signs are normal. He appears not malnourished.  Non-toxic appearance.   HENT:   Head: Normocephalic and atraumatic.   Eyes: Pupils are equal, round, and reactive to light. Conjunctivae are normal.   Neck: Normal range of motion. Neck supple. No JVD present.   Cardiovascular: Normal rate, regular rhythm and intact distal pulses.   Pulmonary/Chest: Effort normal and breath sounds normal. He exhibits tenderness.   Abdominal: Soft. Bowel sounds are normal. He exhibits no distension. There is abdominal tenderness (TTP in left upper quadrant and epigastric region). There is no rebound and no guarding.   Musculoskeletal: Normal range of motion.         General: No edema.   Neurological: He is alert and oriented to person, place, and time. GCS score is 15.   Skin: Skin is warm and dry. He is not diaphoretic.   Psychiatric: Mood, memory, affect and judgment normal.   Nursing note and vitals reviewed.        LAB RESULTS  Lab Results (last 24 hours)     Procedure  Component Value Units Date/Time    CBC & Differential [[id number]]  (Normal) Collected: 11/03/20 1955    Specimen: Blood Updated: 11/03/20 2007    Narrative:      The following orders were created for panel order CBC & Differential.  Procedure                               Abnormality         Status                     ---------                               -----------         ------                     CBC Auto Differential[[id number]]        Normal              Final result                 Please view results for these tests on the individual orders.    Comprehensive Metabolic Panel [[id number]]  (Abnormal) Collected: 11/03/20 1955    Specimen: Blood Updated: 11/03/20 2025     Glucose 104 mg/dL      BUN 9 mg/dL      Creatinine 1.09 mg/dL      Sodium 139 mmol/L      Potassium 3.3 mmol/L      Chloride 102 mmol/L      CO2 22.0 mmol/L      Calcium 9.8 mg/dL      Total Protein 7.3 g/dL      Albumin 4.40 g/dL      ALT (SGPT) 21 U/L      AST (SGOT) 17 U/L      Alkaline Phosphatase 42 U/L      Total Bilirubin 0.6 mg/dL      eGFR Non African Amer 77 mL/min/1.73      Globulin 2.9 gm/dL      A/G Ratio 1.5 g/dL      BUN/Creatinine Ratio 8.3     Anion Gap 15.0 mmol/L     Narrative:      GFR Normal >60  Chronic Kidney Disease <60  Kidney Failure <15      Lipase [[id number]]  (Normal) Collected: 11/03/20 1955    Specimen: Blood Updated: 11/03/20 2025     Lipase 50 U/L     Troponin [[id number]]  (Normal) Collected: 11/03/20 1955    Specimen: Blood Updated: 11/03/20 2027     Troponin T <0.010 ng/mL     Narrative:      Troponin T Reference Range:  <= 0.03 ng/mL-   Negative for AMI  >0.03 ng/mL-     Abnormal for myocardial necrosis.  Clinicians would have to utilize clinical acumen, EKG, Troponin and serial changes to determine if it is an Acute Myocardial Infarction or myocardial injury due to an underlying chronic condition.       Results may be falsely decreased if patient taking Biotin.      D-dimer, Quantitative [[id number]]   (Normal) Collected: 11/03/20 1955    Specimen: Blood Updated: 11/03/20 2040     D-Dimer, Quantitative <0.27 MCGFEU/mL     Narrative:      Can be elevated in, but is not diagnostic for deep vein thrombosis (DVT) or pulmonary embolis (PE).  It is also elevated in other medical conditions.  Clinical correlation is required.  The negative cut-off value for the D-Dimer is 0.50 mcg FEU/mL for DVT and PE.      CBC Auto Differential [[id number]]  (Normal) Collected: 11/03/20 1955    Specimen: Blood Updated: 11/03/20 2007     WBC 6.96 10*3/mm3      RBC 5.58 10*6/mm3      Hemoglobin 16.0 g/dL      Hematocrit 48.4 %      MCV 86.7 fL      MCH 28.7 pg      MCHC 33.1 g/dL      RDW 12.8 %      RDW-SD 40.4 fl      MPV 10.1 fL      Platelets 269 10*3/mm3      Neutrophil % 52.6 %      Lymphocyte % 35.5 %      Monocyte % 10.5 %      Eosinophil % 0.6 %      Basophil % 0.7 %      Immature Grans % 0.1 %      Neutrophils, Absolute 3.66 10*3/mm3      Lymphocytes, Absolute 2.47 10*3/mm3      Monocytes, Absolute 0.73 10*3/mm3      Eosinophils, Absolute 0.04 10*3/mm3      Basophils, Absolute 0.05 10*3/mm3      Immature Grans, Absolute 0.01 10*3/mm3      nRBC 0.0 /100 WBC     Urinalysis With Microscopic If Indicated (No Culture) - Urine, Clean Catch [[id number]]  (Normal) Collected: 11/03/20 2159    Specimen: Urine, Clean Catch Updated: 11/03/20 2210     Color, UA Yellow     Appearance, UA Clear     pH, UA 7.0     Specific Gravity, UA <=1.005     Glucose, UA Negative     Ketones, UA Negative     Bilirubin, UA Negative     Blood, UA Negative     Protein, UA Negative     Leuk Esterase, UA Negative     Nitrite, UA Negative     Urobilinogen, UA 0.2 E.U./dL    Narrative:      Urine microscopic not indicated.            I ordered the above labs and reviewed the results    RADIOLOGY  Xr Chest 2 View    Result Date: 11/3/2020  CR Chest 2 Vws INDICATION:  Chest and epigastric pain since yesterday. Near syncope. COMPARISON:  8/26/2019 FINDINGS: PA and  lateral views of the chest.  Heart and mediastinal contours are normal. The lungs are clear. No pneumothorax or pleural effusion. There is chronic mild elevation of the right hemidiaphragm.     No acute cardiopulmonary findings. Signer Name: Anoop Vargas MD  Signed: 11/3/2020 10:00 PM  Workstation Name: [facility]  [facility]    Ct Abdomen Pelvis With Contrast    Result Date: 11/3/2020  CT Abdomen Pelvis W INDICATION: EGD with biopsy and colonoscopy yesterday. Worsening abdominal pain. TECHNIQUE: CT of the abdomen and pelvis with Isovue-370 IV contrast. Coronal and sagittal reconstructions were obtained.  Radiation dose reduction techniques included automated exposure control or exposure modulation based on body size. Count of known CT and cardiac nuc med studies performed in previous 12 months: 1. COMPARISON: CT abdomen and pelvis 10/24/2020 FINDINGS: Abdomen: Lung bases unremarkable. Liver, spleen and gallbladder normal. Pancreas, kidneys and adrenal glands appear normal. Moderate gastric distention with contrast but no evidence of obstruction. This may represent gastric ileus. Small and large bowel unremarkable. The visualized appendix appears normal. No free air or free fluid. Retroperitoneum unremarkable. Pelvis: Bladder moderately distended. Prostate appears normal. Osseous structures and soft tissues unremarkable.     Mildly distended contrast-filled stomach may represent a component of gastric ileus. No evidence of obstruction with contrast noted throughout the small bowel and into the right colon. Signer Name: GEMINI Min MD  Signed: 11/3/2020 10:02 PM  Workstation Name: [email or web address]  [facility]      RADIOLOGY        Study: Chest x-ray    Findings: No acute cardiopulmonary findings    Interpreted contemporaneously with treatment by Dr. Vargas, independently viewed by me    RADIOLOGY        Study: CT abdomen and pelvis with contrast    Findings:  "Mildly distended contrast-filled stomach may represent a component of gastric ileus. No evidence of obstruction with contrast noted throughout the small bowel and into the right colon.    Interpreted contemporaneously with treatment by Dr. Min, independently viewed by me    I ordered the above radiologic testing and reviewed the results    PROCEDURES  Procedures      PROGRESS AND CONSULTS  ED Course as of Nov 03 2232 Tue Nov 03, 2020 2001 EKG         EKG time / Interpretation time: 1957/2000  Rhythm/Rate: Sinus rhythm, 72   WA: 161  QRS, axis: 2  QTc 443  ST and T waves: There is no significant ST elevation or depression, there are diffuse T wave inversions.   EKG Tracing Interpreted Contemporaneously by me, independently viewed by me and MD.  T wave inversions are new compared with prior EKG from 8/26/2019.      [KS]   2201 Care handed over to Dr. Méndez.  CT is pending.    [KS]   2230 I have reviewed the EKG and labs and radiology reports from tonight's visit.  I spoke with Dr. Borden on the phone about this patient and all the test results.  Dr. Borden is reassured with everything as reported.  He recommends that we start the patient on Carafate.  So I will write a prescription x7 days for Carafate therapy this week.  The patient is to follow-up with Dr. Borden for further review of his recent endoscopy and biopsy results as well as continued medical management.  I did review with the patient usual \"return to ER\" instructions for any worsening signs or symptoms.  He was discharged home in good condition after that.    [ROMANA]      ED Course User Index  [ROMANA] Andrés Méndez MD  [KS] Meghan Herring PA-C           MEDICAL DECISION MAKING  Results were reviewed/discussed with the patient and they were also made aware of online access. Pt also made aware that some labs, such as cultures, will not be resulted during ER visit and follow up with PMD is necessary.     MDM      HEART Score (for " prediction of 6-week risk of major adverse cardiac event) reviewed and/or performed as part of the patient evaluation and treatment planning process.  The result associated with this review/performance is: 0    My differential diagnosis for chest pain includes but is not limited to:  Muscle strain, costochondritis, myositis, pleurisy, rib fracture, intercostal neuritis, herpes zoster, tumor, myocardial infarction, coronary syndrome, unstable angina, angina, aortic dissection, mitral valve prolapse, pericarditis, palpitations, pulmonary embolus, pneumonia, pneumothorax, lung cancer, GERD, esophagitis, esophageal spasm    My differential diagnosis for abdominal pain includes but is not limited to:  Gastritis, gastroenteritis, peptic ulcer disease, GERD, esophageal perforation, acute appendicitis, mesenteric adenitis, Meckel’s diverticulum, epiploic appendagitis, diverticulitis, colon cancer, ulcerative colitis, Crohn’s disease, intussusception, small bowel obstruction, adhesions, ischemic bowel, perforated viscus, ileus, obstipation, biliary colic, cholecystitis, cholelithiasis, Baltazar-Jesus Alhaji, hepatitis, pancreatitis, common bile duct obstruction, cholangitis, bile leak, splenic trauma, splenic rupture, splenic infarction, splenic abscess, abdominal abscess, ascites, spontaneous bacterial peritonitis, hernia, UTI, cystitis, prostatitis,ureterolithiasis, urinary obstruction, AAA, myocardial infarction, pneumonia, cancer, porphyria, DKA, medications, sickle cell, viral syndrome, zoster      DIAGNOSIS  Final diagnoses:   Generalized abdominal pain   Gastritis, presence of bleeding unspecified, unspecified chronicity, unspecified gastritis type       Latest Documented Vital Signs:  As of 22:32 EST  BP- 152/84 HR- 84 Temp- 98.3 °F (36.8 °C) (Oral) O2 sat- 96%    DISPOSITION  Home    Discussed pertinent labs and imaging findings with the patient/family.  Patient/Family voiced understanding of need to follow-up for  recheck, further testing as needed.  Return to the emergency Department warnings were given.         Medication List      New Prescriptions    sucralfate 1 g tablet  Commonly known as: CARAFATE  Take 1 tablet by mouth 3 (Three) Times a Day for 7 days.           Where to Get Your Medications      You can get these medications from any pharmacy    Bring a paper prescription for each of these medications  · sucralfate 1 g tablet             Follow-up Information     Suha, Jaylen Nugent MD. Schedule an appointment as soon as possible for a visit in 1 week.    Specialty: Gastroenterology  Why: Follow-up with your gastroenterologist for further management  Contact information:  [address]  [address]  [facility] [address] [phone]                     Dictated utilizing Dragon dictation     Andrés Méndez MD  11/03/20 2239

## 2020-11-20 ENCOUNTER — HOSPITAL ENCOUNTER (OUTPATIENT)
Dept: NUCLEAR MEDICINE | Facility: HOSPITAL | Age: 35
Discharge: HOME | End: 2020-11-20

## 2020-11-20 ENCOUNTER — HOSPITAL ENCOUNTER (OUTPATIENT)
Dept: ULTRASOUND IMAGING | Facility: HOSPITAL | Age: 35
Discharge: HOME | End: 2020-11-20
Admitting: INTERNAL MEDICINE

## 2020-11-20 DIAGNOSIS — R10.13 DYSPEPSIA: ICD-10-CM

## 2020-11-20 PROCEDURE — 0 TECHNETIUM TC 99M MEBROFENIN KIT: Performed by: INTERNAL MEDICINE

## 2020-11-20 PROCEDURE — A9537 TC99M MEBROFENIN: HCPCS | Performed by: INTERNAL MEDICINE

## 2020-11-20 PROCEDURE — 78227 HEPATOBIL SYST IMAGE W/DRUG: CPT

## 2020-11-20 PROCEDURE — 76705 ECHO EXAM OF ABDOMEN: CPT

## 2020-11-20 PROCEDURE — 25010000002 SINCALIDE PER 5 MCG: Performed by: INTERNAL MEDICINE

## 2020-11-20 RX ORDER — KIT FOR THE PREPARATION OF TECHNETIUM TC 99M MEBROFENIN 45 MG/10ML
1 INJECTION, POWDER, LYOPHILIZED, FOR SOLUTION INTRAVENOUS
Status: COMPLETED | OUTPATIENT
Start: 2020-11-20 | End: 2020-11-20

## 2020-11-20 RX ADMIN — MEBROFENIN 1 DOSE: 45 INJECTION, POWDER, LYOPHILIZED, FOR SOLUTION INTRAVENOUS at 07:45

## 2020-11-20 RX ADMIN — SINCALIDE 1.8 MCG: 5 INJECTION, POWDER, LYOPHILIZED, FOR SOLUTION INTRAVENOUS at 09:39

## 2020-12-04 ENCOUNTER — TELEPHONE (OUTPATIENT)
Dept: GASTROENTEROLOGY | Facility: CLINIC | Age: 35
End: 2020-12-04

## 2020-12-04 DIAGNOSIS — R11.2 NAUSEA AND VOMITING, INTRACTABILITY OF VOMITING NOT SPECIFIED, UNSPECIFIED VOMITING TYPE: ICD-10-CM

## 2020-12-04 DIAGNOSIS — R14.2 BELCHING: ICD-10-CM

## 2020-12-04 DIAGNOSIS — K59.1 FUNCTIONAL DIARRHEA: ICD-10-CM

## 2020-12-04 DIAGNOSIS — R14.0 BLOATING: Primary | ICD-10-CM

## 2020-12-04 DIAGNOSIS — R68.81 EARLY SATIETY: ICD-10-CM

## 2020-12-04 DIAGNOSIS — R63.0 LOSS OF APPETITE: ICD-10-CM

## 2020-12-04 NOTE — TELEPHONE ENCOUNTER
SCHEDULED AT [facility] 01/22/2021 - ARRIVE 7:30AM - NPO AFTER MIDNIGHT - BRING MEDICATIONS WITH YOU & SOMETHING TO DO, WILL BE THERE FOR 4 HOURS.    CALLED PATIENT AND LEFT MESSAGE ON HIS VOICE MAIL FOR CALL BACK.

## 2020-12-04 NOTE — TELEPHONE ENCOUNTER
HISTORY: 31 yo sent to see me because of hepatic steatosis noted on CT. He saw Dr. Mackey for LLQ pain and possible hernia. CT was negative for hernia. He was subsequently sent to me. He drinks a beer every 3 month. Weight has been stable. Labs which are available to me from December of 2016 showed normal liver enzymes. No family history of liver disease.   No changes in bowel habits or blood in the stool. No ivda, blood transfusions, tattoos. His current occupation is farming. No episodes of jaundice or ascites.    10/24/20 NEG CT abd in Er for Pain across the upper abdomen, rectal bleed off & on x several months. Has hx of kidney stones. 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    11/2/20 EGD for Dyspepsia/COlon for bleeding Pos Minimal gastritis, Reflux  Neg C, HP, Briceno's,     11/3/20  GB US norm,HIDA 74% CTabd norm x poss gastric ileus?      EVALUATION and PLAN:   I discussed his egd/colon, gb us, hida, and ct results with him.   He is having no appetite, early satiety, intermittent diarrhea, belching and bloating.  (not diabetic).      With his symptoms, I would be glad to order a gastric emptying study to be done prior to his next f/u visit.

## 2021-01-27 DIAGNOSIS — R10.9 ABDOMINAL CRAMPING: ICD-10-CM

## 2021-01-27 DIAGNOSIS — R19.7 DIARRHEA OF PRESUMED INFECTIOUS ORIGIN: ICD-10-CM

## 2021-01-27 RX ORDER — DICYCLOMINE HCL 20 MG
20 TABLET ORAL EVERY 6 HOURS PRN
Qty: 360 TABLET | Refills: 3 | Status: SHIPPED | OUTPATIENT
Start: 2021-01-27

## 2021-02-25 ENCOUNTER — HOSPITAL ENCOUNTER (EMERGENCY)
Facility: HOSPITAL | Age: 36
Discharge: HOME | End: 2021-02-25
Attending: EMERGENCY MEDICINE | Admitting: EMERGENCY MEDICINE

## 2021-02-25 ENCOUNTER — APPOINTMENT (OUTPATIENT)
Dept: GENERAL RADIOLOGY | Facility: HOSPITAL | Age: 36
End: 2021-02-25

## 2021-02-25 VITALS
RESPIRATION RATE: 15 BRPM | TEMPERATURE: 97.5 F | WEIGHT: 175.1 LBS | HEIGHT: 71 IN | BODY MASS INDEX: 24.51 KG/M2 | OXYGEN SATURATION: 98 % | DIASTOLIC BLOOD PRESSURE: 73 MMHG | SYSTOLIC BLOOD PRESSURE: 141 MMHG | HEART RATE: 80 BPM

## 2021-02-25 DIAGNOSIS — R07.89 CHEST WALL PAIN: Primary | ICD-10-CM

## 2021-02-25 LAB
ALBUMIN SERPL-MCNC: 4.5 G/DL (ref 3.5–5.2)
ALBUMIN/GLOB SERPL: 1.5 G/DL
ALP SERPL-CCNC: 54 U/L (ref 39–117)
ALT SERPL W P-5'-P-CCNC: 12 U/L (ref 1–41)
ANION GAP SERPL CALCULATED.3IONS-SCNC: 13.1 MMOL/L (ref 5–15)
AST SERPL-CCNC: 16 U/L (ref 1–40)
BASOPHILS # BLD AUTO: 0.06 10*3/MM3 (ref 0–0.2)
BASOPHILS NFR BLD AUTO: 0.8 % (ref 0–1.5)
BILIRUB SERPL-MCNC: 0.8 MG/DL (ref 0–1.2)
BUN SERPL-MCNC: 11 MG/DL (ref 6–20)
BUN/CREAT SERPL: 10.2 (ref 7–25)
CALCIUM SPEC-SCNC: 9.9 MG/DL (ref 8.6–10.5)
CHLORIDE SERPL-SCNC: 103 MMOL/L (ref 98–107)
CO2 SERPL-SCNC: 24.9 MMOL/L (ref 22–29)
CREAT SERPL-MCNC: 1.08 MG/DL (ref 0.76–1.27)
D DIMER PPP FEU-MCNC: <0.27 MCGFEU/ML (ref 0–0.46)
DEPRECATED RDW RBC AUTO: 43.8 FL (ref 37–54)
EOSINOPHIL # BLD AUTO: 0.03 10*3/MM3 (ref 0–0.4)
EOSINOPHIL NFR BLD AUTO: 0.4 % (ref 0.3–6.2)
ERYTHROCYTE [DISTWIDTH] IN BLOOD BY AUTOMATED COUNT: 13.5 % (ref 12.3–15.4)
GFR SERPL CREATININE-BSD FRML MDRD: 78 ML/MIN/1.73
GLOBULIN UR ELPH-MCNC: 3 GM/DL
GLUCOSE SERPL-MCNC: 113 MG/DL (ref 65–99)
HCT VFR BLD AUTO: 49.8 % (ref 37.5–51)
HGB BLD-MCNC: 16.3 G/DL (ref 13–17.7)
IMM GRANULOCYTES # BLD AUTO: 0.01 10*3/MM3 (ref 0–0.05)
IMM GRANULOCYTES NFR BLD AUTO: 0.1 % (ref 0–0.5)
LYMPHOCYTES # BLD AUTO: 1.67 10*3/MM3 (ref 0.7–3.1)
LYMPHOCYTES NFR BLD AUTO: 22.3 % (ref 19.6–45.3)
MCH RBC QN AUTO: 29 PG (ref 26.6–33)
MCHC RBC AUTO-ENTMCNC: 32.7 G/DL (ref 31.5–35.7)
MCV RBC AUTO: 88.6 FL (ref 79–97)
MONOCYTES # BLD AUTO: 0.62 10*3/MM3 (ref 0.1–0.9)
MONOCYTES NFR BLD AUTO: 8.3 % (ref 5–12)
NEUTROPHILS NFR BLD AUTO: 5.09 10*3/MM3 (ref 1.7–7)
NEUTROPHILS NFR BLD AUTO: 68.1 % (ref 42.7–76)
NRBC BLD AUTO-RTO: 0 /100 WBC (ref 0–0.2)
PLATELET # BLD AUTO: 283 10*3/MM3 (ref 140–450)
PMV BLD AUTO: 9.8 FL (ref 6–12)
POTASSIUM SERPL-SCNC: 3.8 MMOL/L (ref 3.5–5.2)
PROT SERPL-MCNC: 7.5 G/DL (ref 6–8.5)
QT INTERVAL: 350 MS
RBC # BLD AUTO: 5.62 10*6/MM3 (ref 4.14–5.8)
SODIUM SERPL-SCNC: 141 MMOL/L (ref 136–145)
TROPONIN T SERPL-MCNC: <0.01 NG/ML (ref 0–0.03)
TROPONIN T SERPL-MCNC: <0.01 NG/ML (ref 0–0.03)
WBC # BLD AUTO: 7.48 10*3/MM3 (ref 3.4–10.8)

## 2021-02-25 PROCEDURE — 25010000002 KETOROLAC TROMETHAMINE PER 15 MG: Performed by: EMERGENCY MEDICINE

## 2021-02-25 PROCEDURE — 93010 ELECTROCARDIOGRAM REPORT: CPT | Performed by: INTERNAL MEDICINE

## 2021-02-25 PROCEDURE — 84484 ASSAY OF TROPONIN QUANT: CPT | Performed by: EMERGENCY MEDICINE

## 2021-02-25 PROCEDURE — 93005 ELECTROCARDIOGRAM TRACING: CPT

## 2021-02-25 PROCEDURE — 93005 ELECTROCARDIOGRAM TRACING: CPT | Performed by: EMERGENCY MEDICINE

## 2021-02-25 PROCEDURE — 80053 COMPREHEN METABOLIC PANEL: CPT | Performed by: EMERGENCY MEDICINE

## 2021-02-25 PROCEDURE — 85379 FIBRIN DEGRADATION QUANT: CPT | Performed by: EMERGENCY MEDICINE

## 2021-02-25 PROCEDURE — 71045 X-RAY EXAM CHEST 1 VIEW: CPT

## 2021-02-25 PROCEDURE — 99282 EMERGENCY DEPT VISIT SF MDM: CPT | Performed by: EMERGENCY MEDICINE

## 2021-02-25 PROCEDURE — 96374 THER/PROPH/DIAG INJ IV PUSH: CPT

## 2021-02-25 PROCEDURE — 85025 COMPLETE CBC W/AUTO DIFF WBC: CPT | Performed by: EMERGENCY MEDICINE

## 2021-02-25 PROCEDURE — 99284 EMERGENCY DEPT VISIT MOD MDM: CPT

## 2021-02-25 RX ORDER — KETOROLAC TROMETHAMINE 30 MG/ML
15 INJECTION, SOLUTION INTRAMUSCULAR; INTRAVENOUS ONCE
Status: COMPLETED | OUTPATIENT
Start: 2021-02-25 | End: 2021-02-25

## 2021-02-25 RX ORDER — VENLAFAXINE 75 MG/1
75 TABLET ORAL 2 TIMES DAILY
COMMUNITY

## 2021-02-25 RX ORDER — SODIUM CHLORIDE 0.9 % (FLUSH) 0.9 %
10 SYRINGE (ML) INJECTION AS NEEDED
Status: DISCONTINUED | OUTPATIENT
Start: 2021-02-25 | End: 2021-02-25 | Stop reason: HOSPADM

## 2021-02-25 RX ADMIN — KETOROLAC TROMETHAMINE 15 MG: 30 INJECTION, SOLUTION INTRAMUSCULAR; INTRAVENOUS at 15:38

## 2021-02-25 RX ADMIN — SODIUM CHLORIDE 1000 ML: 9 INJECTION, SOLUTION INTRAVENOUS at 12:48

## 2021-10-31 DIAGNOSIS — R10.13 DYSPEPSIA: ICD-10-CM

## 2021-10-31 DIAGNOSIS — R11.2 NAUSEA AND VOMITING, INTRACTABILITY OF VOMITING NOT SPECIFIED, UNSPECIFIED VOMITING TYPE: ICD-10-CM

## 2021-11-01 RX ORDER — OMEPRAZOLE 40 MG/1
CAPSULE, DELAYED RELEASE ORAL
Qty: 90 CAPSULE | Refills: 3 | OUTPATIENT
Start: 2021-11-01

## (undated) DEVICE — SUCTION CANISTER, 3000CC,SAFELINER: Brand: DEROYAL

## (undated) DEVICE — BW-412T DISP COMBO CLEANING BRUSH: Brand: SINGLE USE COMBINATION CLEANING BRUSH

## (undated) DEVICE — Device

## (undated) DEVICE — FRCP BX RADJAW4 NDL 2.8 240CM LG OG BX40

## (undated) DEVICE — SYR LL 3CC

## (undated) DEVICE — KT ORCA ORCAPOD DISP STRL

## (undated) DEVICE — VIAL FORMALIN CAP 10P 40ML

## (undated) DEVICE — SPNG GZ WOVN 4X4IN 12PLY 10/BX STRL

## (undated) DEVICE — THE BITE BLOCK MAXI, LATEX FREE STRAP IS USED TO PROTECT THE ENDOSCOPE INSERTION TUBE FROM BEING BITTEN BY THE PATIENT.

## (undated) DEVICE — GLV SURG SENSICARE PI MIC PF SZ7.5 LF STRL

## (undated) DEVICE — JACKT LAB F/R KNIT CUFF/COLR XLG BLU